# Patient Record
Sex: FEMALE | Race: WHITE | NOT HISPANIC OR LATINO | Employment: FULL TIME | URBAN - METROPOLITAN AREA
[De-identification: names, ages, dates, MRNs, and addresses within clinical notes are randomized per-mention and may not be internally consistent; named-entity substitution may affect disease eponyms.]

---

## 2022-03-07 ENCOUNTER — TELEPHONE (OUTPATIENT)
Dept: GASTROENTEROLOGY | Facility: CLINIC | Age: 57
End: 2022-03-07
Payer: COMMERCIAL

## 2022-03-07 DIAGNOSIS — Z86.010 HISTORY OF COLON POLYPS: ICD-10-CM

## 2022-03-07 DIAGNOSIS — K92.1 HEMATOCHEZIA: Primary | ICD-10-CM

## 2022-03-07 DIAGNOSIS — Z80.0 FAMILY HISTORY OF COLON CANCER: ICD-10-CM

## 2022-03-07 NOTE — TELEPHONE ENCOUNTER
----- Message from Ryann Centeno sent at 3/7/2022  9:55 AM CST -----  Bethany Clemons , Elver, is returning a missed call to schedule her appointment for a colonoscopy. 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 [phone]    He stated that she is passing blood when she uses the bathroom and that its pretty important for her to get this appointment as soon as possible. He said he has been trying to schedule for the past couple of weeks and that her dad  of colon cancer and that they are concerned.

## 2022-03-08 VITALS — WEIGHT: 189 LBS | BODY MASS INDEX: 31.49 KG/M2 | HEIGHT: 65 IN

## 2022-03-08 RX ORDER — FUROSEMIDE 40 MG/1
40 TABLET ORAL DAILY
COMMUNITY

## 2022-03-08 RX ORDER — LORATADINE 10 MG/1
10 TABLET ORAL DAILY
COMMUNITY

## 2022-03-08 RX ORDER — MONTELUKAST SODIUM 10 MG/1
10 TABLET ORAL DAILY
COMMUNITY

## 2022-03-08 RX ORDER — LISDEXAMFETAMINE DIMESYLATE 60 MG/1
60 CAPSULE ORAL EVERY MORNING
COMMUNITY

## 2022-03-08 RX ORDER — ALBUTEROL SULFATE 0.83 MG/ML
2.5 SOLUTION RESPIRATORY (INHALATION) EVERY 6 HOURS PRN
COMMUNITY

## 2022-03-08 RX ORDER — OLMESARTAN MEDOXOMIL AND HYDROCHLOROTHIAZIDE 20/12.5 20; 12.5 MG/1; MG/1
1 TABLET ORAL DAILY
COMMUNITY
End: 2024-01-05

## 2022-03-08 RX ORDER — DULOXETIN HYDROCHLORIDE 60 MG/1
60 CAPSULE, DELAYED RELEASE ORAL DAILY
COMMUNITY

## 2022-03-08 RX ORDER — LOVASTATIN 20 MG/1
20 TABLET ORAL DAILY
COMMUNITY

## 2022-03-08 RX ORDER — HYDROXYCHLOROQUINE SULFATE 200 MG/1
200 TABLET, FILM COATED ORAL 2 TIMES DAILY
COMMUNITY

## 2022-03-08 RX ORDER — LEVOTHYROXINE SODIUM 125 UG/1
125 TABLET ORAL
COMMUNITY

## 2022-03-08 RX ORDER — PANTOPRAZOLE SODIUM 40 MG/1
40 TABLET, DELAYED RELEASE ORAL DAILY
Status: ON HOLD | COMMUNITY
End: 2024-02-20

## 2022-03-09 RX ORDER — SOD SULF/POT CHLORIDE/MAG SULF 1.479 G
12 TABLET ORAL DAILY
Qty: 24 TABLET | Refills: 0 | Status: SHIPPED | OUTPATIENT
Start: 2022-03-09

## 2022-04-18 RX ORDER — SOD SULF/POT CHLORIDE/MAG SULF 1.479 G
12 TABLET ORAL DAILY
Qty: 24 TABLET | Refills: 0 | Status: SHIPPED | OUTPATIENT
Start: 2022-04-18

## 2022-04-20 ENCOUNTER — TELEPHONE (OUTPATIENT)
Dept: ADMINISTRATIVE | Facility: CLINIC | Age: 57
End: 2022-04-20
Payer: COMMERCIAL

## 2022-04-20 RX ORDER — SODIUM, POTASSIUM,MAG SULFATES 17.5-3.13G
1 SOLUTION, RECONSTITUTED, ORAL ORAL ONCE
Qty: 1 KIT | Refills: 0 | Status: SHIPPED | OUTPATIENT
Start: 2022-04-20 | End: 2022-04-20

## 2022-04-20 NOTE — TELEPHONE ENCOUNTER
----- Message from Sandra Moreno sent at 4/20/2022  4:46 PM CDT -----  Pt has procedure on 4/26 and  states they are having a hard time getting prep. Insurance is not approving it, states they need prior authorization sent from office. Please call  back at [phone](Elver) - may need to get it sent somewhere else

## 2022-04-20 NOTE — TELEPHONE ENCOUNTER
Spoke with patient to let him know we do not do PA's on preps and a different prep is being sent out to the pharmacy. And answered questions regarding pre-registering -PANCHO

## 2022-04-26 ENCOUNTER — OUTSIDE PLACE OF SERVICE (OUTPATIENT)
Dept: GASTROENTEROLOGY | Facility: CLINIC | Age: 57
End: 2022-04-26
Payer: COMMERCIAL

## 2022-04-26 LAB — CRC RECOMMENDATION EXT: NORMAL

## 2022-04-26 PROCEDURE — 45385 COLONOSCOPY W/LESION REMOVAL: CPT | Mod: 33,,, | Performed by: INTERNAL MEDICINE

## 2022-04-26 PROCEDURE — 45380 PR COLONOSCOPY,BIOPSY: ICD-10-PCS | Mod: 33,59,, | Performed by: INTERNAL MEDICINE

## 2022-04-26 PROCEDURE — 45380 COLONOSCOPY AND BIOPSY: CPT | Mod: 33,59,, | Performed by: INTERNAL MEDICINE

## 2022-04-26 PROCEDURE — 45385 PR COLONOSCOPY,REMV LESN,SNARE: ICD-10-PCS | Mod: 33,,, | Performed by: INTERNAL MEDICINE

## 2022-04-27 LAB — SPECIMEN TO PATHOLOGY: NORMAL

## 2022-04-27 NOTE — PROGRESS NOTES
7 polyps: 4 TA, 2 lymph, repeat colon w/agg prep and adult colonoscope in one year.  CMA to notify patient.  NBP

## 2022-09-29 ENCOUNTER — DOCUMENTATION ONLY (OUTPATIENT)
Dept: GASTROENTEROLOGY | Facility: CLINIC | Age: 57
End: 2022-09-29
Payer: COMMERCIAL

## 2023-04-26 DIAGNOSIS — Z80.0 FAMILY HISTORY OF COLON CANCER: ICD-10-CM

## 2023-04-26 DIAGNOSIS — Z86.010 PERSONAL HISTORY OF COLONIC POLYPS: Primary | ICD-10-CM

## 2023-05-03 VITALS — WEIGHT: 170 LBS | BODY MASS INDEX: 28.32 KG/M2 | HEIGHT: 65 IN

## 2023-05-03 RX ORDER — HYDROXYCHLOROQUINE SULFATE 200 MG/1
TABLET, FILM COATED ORAL
COMMUNITY
Start: 2023-04-05

## 2023-05-03 RX ORDER — FUROSEMIDE 40 MG/1
TABLET ORAL
COMMUNITY
Start: 2023-04-05

## 2023-05-03 RX ORDER — MONTELUKAST SODIUM 10 MG/1
TABLET ORAL
COMMUNITY
Start: 2023-03-25

## 2023-05-03 RX ORDER — LEFLUNOMIDE 20 MG/1
TABLET ORAL
COMMUNITY
Start: 2023-04-05

## 2023-05-03 RX ORDER — DULOXETIN HYDROCHLORIDE 60 MG/1
CAPSULE, DELAYED RELEASE ORAL
COMMUNITY
Start: 2023-03-25

## 2023-05-03 RX ORDER — ATORVASTATIN CALCIUM 20 MG/1
TABLET, FILM COATED ORAL
COMMUNITY
Start: 2023-04-05

## 2023-05-03 RX ORDER — ALBUTEROL SULFATE 90 UG/1
AEROSOL, METERED RESPIRATORY (INHALATION)
COMMUNITY
Start: 2023-01-07

## 2023-05-03 RX ORDER — PREDNISONE 2.5 MG/1
TABLET ORAL
COMMUNITY
Start: 2023-03-25

## 2023-05-03 RX ORDER — BELIMUMAB 200 MG/ML
SOLUTION SUBCUTANEOUS
COMMUNITY
Start: 2023-04-19

## 2023-05-03 RX ORDER — POTASSIUM CHLORIDE 20 MEQ/1
TABLET, EXTENDED RELEASE ORAL
COMMUNITY
Start: 2023-04-05

## 2023-05-03 RX ORDER — OLMESARTAN MEDOXOMIL AND HYDROCHLOROTHIAZIDE 20/12.5 20; 12.5 MG/1; MG/1
TABLET ORAL
COMMUNITY
Start: 2023-04-05 | End: 2024-01-05

## 2023-05-03 RX ORDER — FLUTICASONE FUROATE, UMECLIDINIUM BROMIDE AND VILANTEROL TRIFENATATE 200; 62.5; 25 UG/1; UG/1; UG/1
POWDER RESPIRATORY (INHALATION)
COMMUNITY
Start: 2023-04-05

## 2023-05-03 RX ORDER — PANTOPRAZOLE SODIUM 20 MG/1
TABLET, DELAYED RELEASE ORAL
Status: ON HOLD | COMMUNITY
Start: 2023-03-25 | End: 2024-02-20 | Stop reason: HOSPADM

## 2023-05-03 RX ORDER — LEVOTHYROXINE SODIUM 125 UG/1
TABLET ORAL
COMMUNITY
Start: 2023-04-05

## 2023-05-03 RX ORDER — ASPIRIN 81 MG/1
81 TABLET ORAL 2 TIMES DAILY
COMMUNITY

## 2023-05-03 RX ORDER — LISDEXAMFETAMINE DIMESYLATE 60 MG/1
CAPSULE ORAL
COMMUNITY
Start: 2023-04-24

## 2023-05-03 NOTE — TELEPHONE ENCOUNTER
Pt returned call and got scheduled for repeat colonoscopy. Pt voiced no hx of cpap, heart stent or walking problem. Pt voiced understood information will be emailed and link also.

## 2023-05-03 NOTE — TELEPHONE ENCOUNTER
Return call to pt and reached out to pt office and l/m w/ receptions to have pt call the office back to be set up for a colonoscopy.

## 2023-05-05 RX ORDER — SOD SULF/POT CHLORIDE/MAG SULF 1.479 G
12 TABLET ORAL DAILY
Qty: 24 TABLET | Refills: 0 | Status: SHIPPED | OUTPATIENT
Start: 2023-05-05

## 2023-05-05 NOTE — TELEPHONE ENCOUNTER
Order signed. Patient needs aggressive prep. She will need to take MiraLax 1 capful two or three times daily the week leading up to colonoscopy with the goal of having loose stools, then the sutab the day before.   MLC

## 2023-05-08 RX ORDER — AMOXICILLIN 500 MG
CAPSULE ORAL DAILY
COMMUNITY

## 2023-05-08 NOTE — TELEPHONE ENCOUNTER
Reached out to pt and l/m for  pt letting her know they reached back out to me and want her to have a aggressive prep and also resent out her email and updated the information

## 2023-08-23 ENCOUNTER — TELEPHONE (OUTPATIENT)
Dept: GASTROENTEROLOGY | Facility: CLINIC | Age: 58
End: 2023-08-23
Payer: COMMERCIAL

## 2023-08-23 DIAGNOSIS — R49.0 HOARSENESS: ICD-10-CM

## 2023-08-23 DIAGNOSIS — K21.9 GERD (GASTROESOPHAGEAL REFLUX DISEASE): Primary | ICD-10-CM

## 2023-08-23 NOTE — TELEPHONE ENCOUNTER
Called pt and left v/m that NBP is not available to have her colonoscopy on 9/5/23, and would like to reschedule to 9/1/23 @ [facility]. Pending call back. gunnar

## 2023-08-24 ENCOUNTER — TELEPHONE (OUTPATIENT)
Dept: GASTROENTEROLOGY | Facility: CLINIC | Age: 58
End: 2023-08-24
Payer: COMMERCIAL

## 2023-08-24 DIAGNOSIS — K21.9 GASTROESOPHAGEAL REFLUX DISEASE, UNSPECIFIED WHETHER ESOPHAGITIS PRESENT: Primary | ICD-10-CM

## 2023-08-24 DIAGNOSIS — K63.5 POLYP OF COLON, UNSPECIFIED PART OF COLON, UNSPECIFIED TYPE: ICD-10-CM

## 2023-08-24 NOTE — TELEPHONE ENCOUNTER
Last name listed as Kaci in [facility]. Pediatrician.    Referral reviewed for EGD to be added to colonoscopy. Okay to do so for GERD Dx. Needs new order for adult colonoscope. Needs aggressive prep. Referral listed Eliquis. Is patient taking Eliquis? If so, then will need clearance to hold x1 day from her cardiologist. New order signed. Needs to move colon on Tuesday 9/5/2023 to E/C with adult colonscope on Friday 9/1/2023. Confirm she has prep.  NBP

## 2023-08-24 NOTE — TELEPHONE ENCOUNTER
Called pt and left another v/m to call our office back. Need to reschedule her EGD/Colon to 9/1/23. As well as f/u if the pt is taking Eliquis. See NBP note. gunnar

## 2023-08-25 NOTE — TELEPHONE ENCOUNTER
Called patient to let her know the procedure was moved to 9/1/23. No answer. I left a voicemail telling her to call the office back. -DMP

## 2023-08-28 VITALS — BODY MASS INDEX: 28.32 KG/M2 | WEIGHT: 170 LBS | HEIGHT: 65 IN

## 2023-08-28 NOTE — TELEPHONE ENCOUNTER
"[facility]  [address] [address]  Phone: [phone]  Fax: [phone]    History & Physical         Provider: Dr. Haily Orellana    Patient Name: Bethany Estrada MD                                                                       (age):1965  58 y.o.           Gender: female   Phone: [phone]     Referring Physician: Nicki Hernández     Vital Signs:   Height - 5' 5"  Weight - 170 lb  BMI -  28.29    Plan: EGD/Colonoscopy w/adult colonoscope @ [facility]    Encounter Diagnoses   Name Primary?    Gastroesophageal reflux disease, unspecified whether esophagitis present Yes    Polyp of colon, unspecified part of colon, unspecified type            History:      Past Medical History:   Diagnosis Date    ADD (attention deficit disorder)     Anxiety disorder, unspecified     BMI 28.0-28.9,adult     Chronic constipation     Depression     Essential (primary) hypertension     GERD (gastroesophageal reflux disease)     High cholesterol     Mild intermittent asthma, uncomplicated     Systemic lupus erythematosus, organ or system involvement unspecified     Unspecified urinary incontinence     Vasospasm, coronary:  and  MI but no obstructive CAD     Venous insufficiency of leg       Past Surgical History:   Procedure Laterality Date    ABDOMINOPLASTY  2017    AUGMENTATION OF BREAST       SECTION      COLONOSCOPY      use adult colonoscope    HYSTERECTOMY      pelvic floor reconstruction         Medication List with Changes/Refills   Current Medications    ALBUTEROL (PROVENTIL) 2.5 MG /3 ML (0.083 %) NEBULIZER SOLUTION    Take 2.5 mg by nebulization every 6 (six) hours as needed. Rescue    ALBUTEROL (PROVENTIL/VENTOLIN HFA) 90 MCG/ACTUATION INHALER    use 2 sprays BY MOUTH EVERY FOUR HOURS AS DIRECTED AS NEEDED FOR BREATHING    ASPIRIN (ECOTRIN) 81 MG EC TABLET    Take 81 mg by mouth 2 (two) times a day.    ATORVASTATIN (LIPITOR) 20 MG " Problem: Potential for Falls  Goal: Patient will remain free of falls  Description  INTERVENTIONS:  - Assess patient frequently for physical needs  -  Identify cognitive and physical deficits and behaviors that affect risk of falls    -  [place] fall precautions as indicated by assessment   - Educate patient/family on patient safety including physical limitations  - Instruct patient to call for assistance with activity based on assessment  - Modify environment to reduce risk of injury  - Consider OT/PT consult to assist with strengthening/mobility  Outcome: Progressing     Problem: Nutrition/Hydration-ADULT  Goal: Nutrient/Hydration intake appropriate for improving, restoring or maintaining nutritional needs  Description  INTERVENTIONS:  - Monitor oral intake, urinary output, labs, and treatment plans  - Assess nutrition and hydration status and recommend course of action  - Evaluate amount of meals eaten  - Recommend/ encourage appropriate diets, oral nutritional supplements, and vitamin/mineral supplements  - Assess need for intravenous fluids  - Provide specific nutrition/hydration education as appropriate  - Include patient in decisions related to nutrition   Outcome: Progressing     Problem: PAIN - ADULT  Goal: Verbalizes/displays adequate comfort level or baseline comfort level  Description  Interventions:  - Encourage patient to monitor pain and request assistance  - Assess pain using appropriate pain scale  - Administer analgesics based on type and severity of pain and evaluate response  - Implement non-pharmacological measures as appropriate and evaluate response  - Consider cultural and social influences on pain and pain management  - Notify physician/advanced practitioner if interventions unsuccessful or patient reports new pain  Outcome: Progressing     Problem: INFECTION - ADULT  Goal: Absence or prevention of progression during hospitalization  Description  INTERVENTIONS:  - Assess and monitor for TABLET    TAKE ONE TABLET BY MOUTH ONE TIME A DAY AS DIRECTED FOR CHOLESTEROL    BENLYSTA 200 MG/ML ATIN    INJECT ONE PEN INJECTOR SUBCUTANEOUSLY ONCE A WEEK    DULOXETINE (CYMBALTA) 60 MG CAPSULE    Take 60 mg by mouth once daily.    DULOXETINE (CYMBALTA) 60 MG CAPSULE    TAKE ONE CAPSULE BY MOUTH ONE TIME A DAY AS DIRECTED    FLUTICASONE-UMECLIDIN-VILANTER (TRELEGY ELLIPTA) 200-62.5-25 MCG INHALER    Inhale 1 puff into the lungs once daily.    FUROSEMIDE (LASIX) 40 MG TABLET    Take 40 mg by mouth once daily.    FUROSEMIDE (LASIX) 40 MG TABLET    TAKE ONE TABLET BY MOUTH TWO TIMES A DAY AS DIRECTED AS NEEDED FOR FLUID    HYDROXYCHLOROQUINE (PLAQUENIL) 200 MG TABLET    Take 200 mg by mouth 2 (two) times a day.    HYDROXYCHLOROQUINE (PLAQUENIL) 200 MG TABLET    TAKE ONE TABLET BY MOUTH TWO TIMES A DAY AS DIRECTED FOR ARTHRITIS    LEFLUNOMIDE (ARAVA) 20 MG TAB    TAKE ONE TABLET BY MOUTH ONE TIME A DAY AS DIRECTED FOR ARTHRITIS    LEVOTHYROXINE (SYNTHROID) 125 MCG TABLET    Take 125 mcg by mouth before breakfast.    LEVOTHYROXINE (SYNTHROID) 125 MCG TABLET    TAKE ONE TABLET BY MOUTH ONE TIME A DAY IN THE MORNING on empty stomach AS DIRECTED FOR THYROID SUPPLEMENT    LISDEXAMFETAMINE (VYVANSE) 60 MG CAPSULE    Take 60 mg by mouth every morning.    LORATADINE (CLARITIN) 10 MG TABLET    Take 10 mg by mouth once daily.    LOVASTATIN (MEVACOR) 20 MG TABLET    Take 20 mg by mouth once daily.    MONTELUKAST (SINGULAIR) 10 MG TABLET    Take 10 mg by mouth once daily.    MONTELUKAST (SINGULAIR) 10 MG TABLET    TAKE ONE TABLET BY MOUTH ONE TIME A DAY AS DIRECTED FOR ALLERGIES AND ASTHMA    MULTIVIT,MIN52-FOLIC-VITK-CQ10 ORAL    Take by mouth.    OLMESARTAN-HYDROCHLOROTHIAZIDE (BENICAR HCT) 20-12.5 MG PER TABLET    Take 1 tablet by mouth once daily.    OLMESARTAN-HYDROCHLOROTHIAZIDE (BENICAR HCT) 20-12.5 MG PER TABLET    TAKE ONE TABLET BY MOUTH ONE TIME A DAY AS DIRECTED FOR BLOOD PRESSURE AND FLUID    OMEGA-3 FATTY  signs and symptoms of infection while patient is neutropenic  - Monitor lab/diagnostic results  - Monitor all insertion sites,  - Monitor endotracheal if appropriate and nasal secretions for changes in amount and color  - Whigham appropriate cooling/warming therapies per order  - Administer medications as ordered  - Instruct and encourage patient and family to use good hand hygiene technique  - Identify and instruct in appropriate neutropenic precautions for identified infection/condition   Outcome: Progressing     Problem: DISCHARGE PLANNING  Goal: Discharge to home or other facility with appropriate resources  Description  INTERVENTIONS:  - Identify barriers to discharge w/patient and caregiver  - Arrange for needed discharge resources and transportation as appropriate  - Identify discharge learning needs   - Refer to Case Management Department for coordinating discharge planning if the patient needs post-hospital services based on physician/advanced practitioner order    Outcome: Progressing     Problem: Knowledge Deficit  Goal: Patient/family/caregiver demonstrates understanding of disease process, treatment plan, medications, and discharge instructions  Description  Complete learning assessment and assess knowledge base    Interventions:  - Provide teaching at level of understanding  - Provide teaching via preferred learning methods  Outcome: Progressing     Problem: COPING  Goal: Pt/Family able to verbalize concerns and demonstrate effective coping strategies  Description  INTERVENTIONS:  - Assist patient/family to identify coping skills, available support systems and cultural and spiritual values  - Provide emotional support, including active listening and acknowledgement of concerns of patient and caregivers  - Reduce environmental stimuli, as able  - Provide patient education  - Assess for spiritual pain/suffering and initiate spiritual care, including notification of Pastoral Care or luna based community ACIDS/FISH OIL (FISH OIL-OMEGA-3 FATTY ACIDS) 300-1,000 MG CAPSULE    Take by mouth once daily.    PANTOPRAZOLE (PROTONIX) 20 MG TABLET    TAKE ONE TABLET BY MOUTH ONE TIME A DAY AS DIRECTED FOR ESOPHAGUS    PANTOPRAZOLE (PROTONIX) 40 MG TABLET    Take 40 mg by mouth once daily.    POTASSIUM CHLORIDE SA (K-DUR,KLOR-CON) 20 MEQ TABLET    TAKE ONE TABLET BY MOUTH TWO TIMES A DAY AS DIRECTED FOR POTASSIUM SUPPLEMENT    PREDNISONE (DELTASONE) 2.5 MG TABLET    TAKE TWO TABLETS BY MOUTH ONE TIME A DAY AS DIRECTED FOR INFLAMMATION    SOD SULF-POT CHLORIDE-MAG SULF (SUTAB) 1.479-0.188- 0.225 GRAM TABLET    Take 12 tablets by mouth once daily. Take according to package instructions with indicated amount of water. No breakfast day before test.    SOD SULF-POT CHLORIDE-MAG SULF (SUTAB) 1.479-0.188- 0.225 GRAM TABLET    Take 12 tablets by mouth once daily. Take according to package instructions with indicated amount of water. No breakfast day before test.    SOD SULF-POT CHLORIDE-MAG SULF (SUTAB) 1.479-0.188- 0.225 GRAM TABLET    Take 12 tablets by mouth once daily. Take according to package instructions with indicated amount of water. No breakfast day before test. May substitute with Suprep, Clenpiq, Plenvu, Moviprep or GoLytely based on Rx plan and patient preference.    SOY ISOFL/BLK COH/GR TEA/YERBA (ESTROVEN ENERGY ORAL)    Take 1 tablet by mouth Daily.    TRELEGY ELLIPTA 200-62.5-25 MCG INHALER    INHALE ONE INHALATION BY MOUTH ONE TIME A DAY AS DIRECTED FOR BREATHING    VYVANSE 60 MG CAPSULE    TAKE ONE CAPSULE BY MOUTH ONE TIME A DAY AS DIRECTED for attention deficit      Review of patient's allergies indicates:   Allergen Reactions    Sulfa (sulfonamide antibiotics) Swelling and Rash     Lips swelling     Benzoyl peroxide     Benzoyl peroxide Hives    Botox cosmetic [onabotulinumtoxina (cosmetic)]     Botox [onabotulinumtoxina] Hives    Levofloxacin     Sulfa (sulfonamide antibiotics)       Family History   Problem  as needed  - Assess effectiveness of coping strategies  Outcome: Progressing     Problem: METABOLIC, FLUID AND ELECTROLYTES - ADULT  Goal: Electrolytes maintained within normal limits  Description  INTERVENTIONS:  - Monitor labs and assess patient for signs and symptoms of electrolyte imbalances  - Administer electrolyte replacement as ordered  - Monitor response to electrolyte replacements, including repeat lab results as appropriate  - Instruct patient on fluid and nutrition as appropriate  Outcome: Progressing     Problem: HEMATOLOGIC - ADULT  Goal: Maintains hematologic stability  Description  INTERVENTIONS  - Assess for signs and symptoms of bleeding or hemorrhage  - Monitor labs  - Administer supportive blood products/factors as ordered and appropriate  Outcome: Progressing Relation Age of Onset    Heart disease Mother 72    Colon cancer Father 72    No Known Problems Maternal Grandmother     No Known Problems Maternal Grandfather     No Known Problems Paternal Grandmother     No Known Problems Paternal Grandfather     Heart failure Son 13    Colonic polyp Brother       Social History     Tobacco Use    Smoking status: Never    Smokeless tobacco: Never   Substance Use Topics    Alcohol use: Never     Comment: rare    Drug use: Never        Physical Examination:     General Appearance:___________________________  HEENT: _____________________________________  Abdomen:____________________________________  Heart:________________________________________  Lungs:_______________________________________  Extremities:___________________________________  Skin:_________________________________________  Endocrine:____________________________________  Genitourinary:_________________________________  Neurological:__________________________________      Patient has been evaluated immediately prior to sedation and is medically cleared for endoscopy with IVCS as an ASA class: ______      Physician Signature: _________________________       Date: ________  Time: ________

## 2023-08-29 ENCOUNTER — TELEPHONE (OUTPATIENT)
Dept: GASTROENTEROLOGY | Facility: CLINIC | Age: 58
End: 2023-08-29
Payer: COMMERCIAL

## 2023-08-29 NOTE — TELEPHONE ENCOUNTER
----- Message from Dedra Watts sent at 8/29/2023  8:03 AM CDT -----  Type:  Needs Medical Advice    Who Called: Bethany Estrada MD, ( pt's , Elver )   Symptoms (please be specific): -   How long has patient had these symptoms:  -  Pharmacy name and phone #:  -  Would the patient rather a call back or a response via [facility]?    Best Call Back Number:  [phone] ( Mr Clemons's elliott)   Additional Information: needs to speak w/ nurse, please call says pt has stopped taking the medication and procedure time / instructions

## 2023-08-29 NOTE — TELEPHONE ENCOUNTER
Returned pt's  Elver romero. 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  Pt is taking Eliquis prescribed by Dr. Jett ([place]) for Afib.  Pt stopped taking Eliquis as of Monday (8/28/23) until further instructed by our office.     I explained to pt's  that typically we need to obtain a cardiac clearance from the cardiologist to hold Eliquis for 1 day prior to the procedure. I told him that NBP will be in clinic this afternoon and that I will speak with her and contact him back.       I asked if the pt started MiraLax 1 capful two or three times daily the week prior to the procedure. He stated no, that she was not informed of that. However, her constipation has gotten better. She take MiraLax on a daily basis as needed. gunnar

## 2023-08-30 NOTE — TELEPHONE ENCOUNTER
Rec'd cardiac clearance from Dr. Jett 8/29/23. Pt is okay to hold Eliquis for 1 day before the procedure. Scanned into media and routed to NBP and MLC. gunnar

## 2023-09-01 ENCOUNTER — OUTSIDE PLACE OF SERVICE (OUTPATIENT)
Dept: GASTROENTEROLOGY | Facility: CLINIC | Age: 58
End: 2023-09-01

## 2023-09-01 LAB — CRC RECOMMENDATION EXT: NORMAL

## 2023-09-01 PROCEDURE — 43239 EGD BIOPSY SINGLE/MULTIPLE: CPT | Mod: ,,, | Performed by: INTERNAL MEDICINE

## 2023-09-01 PROCEDURE — 45385 PR COLONOSCOPY,REMV LESN,SNARE: ICD-10-PCS | Mod: 33,,, | Performed by: INTERNAL MEDICINE

## 2023-09-01 PROCEDURE — 45380 PR COLONOSCOPY,BIOPSY: ICD-10-PCS | Mod: 33,59,, | Performed by: INTERNAL MEDICINE

## 2023-09-01 PROCEDURE — 43239 PR EGD, FLEX, W/BIOPSY, SGL/MULTI: ICD-10-PCS | Mod: ,,, | Performed by: INTERNAL MEDICINE

## 2023-09-01 PROCEDURE — 45385 COLONOSCOPY W/LESION REMOVAL: CPT | Mod: 33,,, | Performed by: INTERNAL MEDICINE

## 2023-09-01 PROCEDURE — 45380 COLONOSCOPY AND BIOPSY: CPT | Mod: 33,59,, | Performed by: INTERNAL MEDICINE

## 2023-09-07 ENCOUNTER — TELEPHONE (OUTPATIENT)
Dept: GASTROENTEROLOGY | Facility: CLINIC | Age: 58
End: 2023-09-07
Payer: COMMERCIAL

## 2023-09-07 LAB — SPECIMEN TO PATHOLOGY: NORMAL

## 2023-09-07 NOTE — TELEPHONE ENCOUNTER
DBx nl, GBx mini chr inact gitis w/focal mild react w/o Hp, EGBx ND BE, 3 TA, 1 hyp, repeat EGD and colonoscopy w/adult colonoscope in 3 years.     Notify patient. No signs of infection, precancerous cells or Celiac disease on the upper endoscopy biopsies.  She does have Gore's esophagus w/o precancerous or cancerous cells. Her colon polyps were benign. Repeat EGD and colonoscopy in 3 years. Update in Health Maintenance section of Epic.  NBP

## 2023-10-06 DIAGNOSIS — I49.02 VENTRICULAR FLUTTER: ICD-10-CM

## 2023-10-06 DIAGNOSIS — I47.20 V-TACH: Primary | ICD-10-CM

## 2023-10-11 ENCOUNTER — OFFICE VISIT (OUTPATIENT)
Dept: ELECTROPHYSIOLOGY | Facility: CLINIC | Age: 58
End: 2023-10-11
Payer: COMMERCIAL

## 2023-10-11 ENCOUNTER — CLINICAL SUPPORT (OUTPATIENT)
Dept: CARDIOLOGY | Facility: HOSPITAL | Age: 58
End: 2023-10-11
Attending: INTERNAL MEDICINE
Payer: COMMERCIAL

## 2023-10-11 VITALS
WEIGHT: 180.31 LBS | HEIGHT: 65 IN | HEART RATE: 80 BPM | DIASTOLIC BLOOD PRESSURE: 76 MMHG | SYSTOLIC BLOOD PRESSURE: 160 MMHG | BODY MASS INDEX: 30.04 KG/M2

## 2023-10-11 DIAGNOSIS — I42.8 NICM (NONISCHEMIC CARDIOMYOPATHY): ICD-10-CM

## 2023-10-11 DIAGNOSIS — E78.5 HYPERLIPIDEMIA, UNSPECIFIED HYPERLIPIDEMIA TYPE: ICD-10-CM

## 2023-10-11 DIAGNOSIS — T82.897A OTHER SPECIFIED COMPLICATION OF CARDIAC PROSTHETIC DEVICES, IMPLANTS AND GRAFTS, INITIAL ENCOUNTER: ICD-10-CM

## 2023-10-11 DIAGNOSIS — E03.9 HYPOTHYROIDISM, UNSPECIFIED TYPE: ICD-10-CM

## 2023-10-11 DIAGNOSIS — I48.19 ATRIAL FIBRILLATION, PERSISTENT: ICD-10-CM

## 2023-10-11 DIAGNOSIS — I47.20 V-TACH: ICD-10-CM

## 2023-10-11 DIAGNOSIS — R00.0 WIDE-COMPLEX TACHYCARDIA: ICD-10-CM

## 2023-10-11 DIAGNOSIS — I47.29 OTHER VENTRICULAR TACHYCARDIA: ICD-10-CM

## 2023-10-11 DIAGNOSIS — Z95.810 PRESENCE OF AUTOMATIC (IMPLANTABLE) CARDIAC DEFIBRILLATOR: ICD-10-CM

## 2023-10-11 PROCEDURE — 1159F PR MEDICATION LIST DOCUMENTED IN MEDICAL RECORD: ICD-10-PCS | Mod: CPTII,S$GLB,, | Performed by: INTERNAL MEDICINE

## 2023-10-11 PROCEDURE — 1160F RVW MEDS BY RX/DR IN RCRD: CPT | Mod: CPTII,S$GLB,, | Performed by: INTERNAL MEDICINE

## 2023-10-11 PROCEDURE — 4010F PR ACE/ARB THEARPY RXD/TAKEN: ICD-10-PCS | Mod: CPTII,S$GLB,, | Performed by: INTERNAL MEDICINE

## 2023-10-11 PROCEDURE — 3078F PR MOST RECENT DIASTOLIC BLOOD PRESSURE < 80 MM HG: ICD-10-PCS | Mod: CPTII,S$GLB,, | Performed by: INTERNAL MEDICINE

## 2023-10-11 PROCEDURE — 99204 PR OFFICE/OUTPT VISIT, NEW, LEVL IV, 45-59 MIN: ICD-10-PCS | Mod: S$GLB,,, | Performed by: INTERNAL MEDICINE

## 2023-10-11 PROCEDURE — 3008F BODY MASS INDEX DOCD: CPT | Mod: CPTII,S$GLB,, | Performed by: INTERNAL MEDICINE

## 2023-10-11 PROCEDURE — 4010F ACE/ARB THERAPY RXD/TAKEN: CPT | Mod: CPTII,S$GLB,, | Performed by: INTERNAL MEDICINE

## 2023-10-11 PROCEDURE — 3008F PR BODY MASS INDEX (BMI) DOCUMENTED: ICD-10-PCS | Mod: CPTII,S$GLB,, | Performed by: INTERNAL MEDICINE

## 2023-10-11 PROCEDURE — 99999 PR PBB SHADOW E&M-EST. PATIENT-LVL III: CPT | Mod: PBBFAC,,, | Performed by: INTERNAL MEDICINE

## 2023-10-11 PROCEDURE — 3077F PR MOST RECENT SYSTOLIC BLOOD PRESSURE >= 140 MM HG: ICD-10-PCS | Mod: CPTII,S$GLB,, | Performed by: INTERNAL MEDICINE

## 2023-10-11 PROCEDURE — 3078F DIAST BP <80 MM HG: CPT | Mod: CPTII,S$GLB,, | Performed by: INTERNAL MEDICINE

## 2023-10-11 PROCEDURE — 99204 OFFICE O/P NEW MOD 45 MIN: CPT | Mod: S$GLB,,, | Performed by: INTERNAL MEDICINE

## 2023-10-11 PROCEDURE — 93283 PRGRMG EVAL IMPLANTABLE DFB: CPT

## 2023-10-11 PROCEDURE — 1160F PR REVIEW ALL MEDS BY PRESCRIBER/CLIN PHARMACIST DOCUMENTED: ICD-10-PCS | Mod: CPTII,S$GLB,, | Performed by: INTERNAL MEDICINE

## 2023-10-11 PROCEDURE — 99999 PR PBB SHADOW E&M-EST. PATIENT-LVL III: ICD-10-PCS | Mod: PBBFAC,,, | Performed by: INTERNAL MEDICINE

## 2023-10-11 PROCEDURE — 3077F SYST BP >= 140 MM HG: CPT | Mod: CPTII,S$GLB,, | Performed by: INTERNAL MEDICINE

## 2023-10-11 PROCEDURE — 1159F MED LIST DOCD IN RCRD: CPT | Mod: CPTII,S$GLB,, | Performed by: INTERNAL MEDICINE

## 2023-10-11 RX ORDER — CARVEDILOL 3.12 MG/1
3.12 TABLET ORAL 2 TIMES DAILY
COMMUNITY
Start: 2023-10-05 | End: 2024-01-05 | Stop reason: SDUPTHER

## 2023-10-11 RX ORDER — SACUBITRIL AND VALSARTAN 24; 26 MG/1; MG/1
1 TABLET, FILM COATED ORAL 2 TIMES DAILY
COMMUNITY
Start: 2023-10-05

## 2023-10-11 RX ORDER — AMIODARONE HYDROCHLORIDE 200 MG/1
200 TABLET ORAL DAILY
COMMUNITY
End: 2024-01-05

## 2023-10-11 RX ORDER — APIXABAN 5 MG/1
5 TABLET, FILM COATED ORAL 2 TIMES DAILY
COMMUNITY
Start: 2023-10-02

## 2023-10-11 NOTE — PROGRESS NOTES
Subjective:   Patient ID:  Bethany Sean TEE is a 58 y.o. 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  In setting of recently starting propafenone for AF Rx, developed a WCT at home.   EMS ECG showed WCT with RBBB, R sup axis, transition V4. No Syncope (though nearly so); DCCV'd in the field. [place] better when back to NSR. ICD implanted by Dr Yoo in [place].    AF was DCCV'd 10/2/23 as well. She feels fatigued all the time. SOB. Hard to walk without ASTORGA. GDMT is being titrated by primary cardiologist Dr. Jett ([place]).    cMRI: no scar. 43% LVEF.  echo 2023: 40%  event monitor: SR, 9% PVCs (multifocal)  Stress test 1/2019 neg    My interpretation of today's ECG is APVS. Poor R wave progression. Low voltage.    Review of Systems   Constitutional: Positive for malaise/fatigue.   HENT: Negative.  Negative for ear pain and tinnitus.    Eyes:  Negative for blurred vision.   Cardiovascular:  Positive for dyspnea on exertion. Negative for chest pain, near-syncope, palpitations and syncope.   Respiratory:  Positive for shortness of breath.    Endocrine: Negative.  Negative for polyuria.   Hematologic/Lymphatic: Does not bruise/bleed easily.   Skin: Negative.  Negative for rash.   Musculoskeletal: Negative.  Negative for joint pain and muscle weakness.   Gastrointestinal: Negative.  Negative for abdominal pain and change in bowel habit.   Genitourinary:  Negative for frequency.   Neurological: Negative.  Negative for dizziness and weakness.   Psychiatric/Behavioral: Negative.  Negative for depression. The patient is not  nervous/anxious.    Allergic/Immunologic: Negative for environmental allergies.       Objective:   Physical Exam  Vitals and nursing note reviewed.   Constitutional:       Appearance: Normal appearance. She is well-developed.   HENT:      Head: Normocephalic and atraumatic.   Eyes:      Conjunctiva/sclera: Conjunctivae normal.   Neck:      Thyroid: No thyroid mass or thyromegaly.      Trachea: No tracheal deviation.   Cardiovascular:      Rate and Rhythm: Normal rate and regular rhythm.   Pulmonary:      Effort: Pulmonary effort is normal. No respiratory distress.      Breath sounds: No wheezing.   Abdominal:      General: There is no distension.   Musculoskeletal:         General: Normal range of motion.      Cervical back: Normal range of motion. No edema.   Skin:     General: Skin is warm and dry.      Findings: No rash.   Neurological:      Mental Status: She is alert and oriented to person, place, and time.      Coordination: Coordination normal.   Psychiatric:         Speech: Speech normal.         Behavior: Behavior normal. Behavior is cooperative.         Thought Content: Thought content normal.         Assessment:      1. NICM (nonischemic cardiomyopathy)    2. Wide-complex tachycardia    3. Atrial fibrillation, persistent    4. Hyperlipidemia, unspecified hyperlipidemia type    5. Hypothyroidism, unspecified type        Plan:     Complex case.  WCT most likely VT. Not likely SVT with aberrancy, despite propafenone, as the morphology is not typical for that.  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  Persistent AF    CM  Dr. Jett has begun GDMT; defer titration to them.  PET stress.  Will send genetic screen for familial CM.    2. Discussed AF and its basic pathophysiology, including its health implications and treatment options (rate vs. rhythm control, meds vs. procedural/device treatment) as appropriate for the patient.  Especially given HF, will try hard to rhythm control, including likely PVI eventually.  Pt will find  "ECG of "AFL" -- which may influence her PVI procedure.  Amio for now. Check TSH, LFTs.    3. ICD  Follow here.  Added rate-responsiveness today.          "

## 2023-10-11 NOTE — LETTER
October 11, 2023        Tyshawn Jett MD  [address]  [address]  [facility] [address]             [facility] - Electrophysiology [facility]  [address]  [facility] [address]  Phone: [phone]  Fax: [phone]   Patient: Bethany Estrada MD   MR Number: [id number]   YOB: 1965   Date of Visit: 10/11/2023       Dear Dr. Jett:    Thank you for referring Bethany Estrada MD to me for evaluation. Attached you will find relevant portions of my assessment and plan of care.    If you have questions, please do not hesitate to call me. I look forward to following Bethany Estrada MD along with you.    Sincerely,      Jose Alejandro Fernandes MD            CC    No Recipients    Enclosure

## 2023-10-12 ENCOUNTER — PATIENT MESSAGE (OUTPATIENT)
Dept: CARDIOLOGY | Facility: CLINIC | Age: 58
End: 2023-10-12
Payer: COMMERCIAL

## 2023-10-13 ENCOUNTER — PATIENT MESSAGE (OUTPATIENT)
Dept: ELECTROPHYSIOLOGY | Facility: CLINIC | Age: 58
End: 2023-10-13
Payer: COMMERCIAL

## 2023-10-15 NOTE — TELEPHONE ENCOUNTER
Pt sent an ECG interpreted as AFL from 5/13. It appears to be more consistent with AF. Definitely not typical AFL.

## 2023-10-16 LAB
OHS CV AF BURDEN PERCENT: < 1
OHS CV DC REMOTE DEVICE TYPE: NORMAL
OHS CV RV PACING PERCENT: 0 %

## 2023-10-24 ENCOUNTER — TELEPHONE (OUTPATIENT)
Dept: ELECTROPHYSIOLOGY | Facility: CLINIC | Age: 58
End: 2023-10-24
Payer: COMMERCIAL

## 2023-10-24 DIAGNOSIS — Z79.899 HIGH RISK MEDICATIONS (NOT ANTICOAGULANTS) LONG-TERM USE: Primary | ICD-10-CM

## 2023-10-24 NOTE — TELEPHONE ENCOUNTER
Jose Alejandro Fernandes MD Bush, Becky, RN  Caller: Unspecified (Yesterday,  9:12 AM)  The screening test TSH was mildly high, but the confirmatory test was within normal range. This does not require any treatment or further evaluation.                 Spoke with spouse, informed him of Dr Fernandes's message, will need hepatic panel done, will fax orders to [facility] in [place] per his request, also informed him that the genetic testing ordered by Dr Fernandes has been ordered through [facility] [facility] and they will receive the testing kit at their home, spouse also wanting to see about scheduling her PET stress that was ordered, will have pt called to get that scheduled

## 2023-10-24 NOTE — TELEPHONE ENCOUNTER
----- Message from Armida Arredondo MA sent at 10/23/2023  9:37 AM CDT -----  Regarding: FW: Results  Is there anything I should relay to the pt from their lab results?   ----- Message -----  From: Sangeetha Monaco  Sent: 10/23/2023   9:14 AM CDT  To: [facility] Staff  Subject: Results                                          Patient  Elver calling to see if his wife test results came back. Please call back@ [phone]. Thank you Sangeetha

## 2023-10-24 NOTE — TELEPHONE ENCOUNTER
Called pt to schedule them for a test. There was no answer so a message and callback number was left.

## 2023-10-26 ENCOUNTER — TELEPHONE (OUTPATIENT)
Dept: ELECTROPHYSIOLOGY | Facility: CLINIC | Age: 58
End: 2023-10-26
Payer: COMMERCIAL

## 2023-10-26 NOTE — TELEPHONE ENCOUNTER
"Pt's  called to confirm stress test time and location. Notified them that there were no records available through [facility]. Pt's  said the provider left and that th ept was seen there 10-15 years ago. They received records and will upload them into the portal although stating that they "aren't much".   "

## 2023-10-30 LAB — EGD FOLLOW UP EXTERNAL: NORMAL

## 2023-11-09 ENCOUNTER — CLINICAL SUPPORT (OUTPATIENT)
Dept: CARDIOLOGY | Facility: HOSPITAL | Age: 58
End: 2023-11-09
Attending: INTERNAL MEDICINE
Payer: COMMERCIAL

## 2023-11-09 DIAGNOSIS — T82.897A OTHER SPECIFIED COMPLICATION OF CARDIAC PROSTHETIC DEVICES, IMPLANTS AND GRAFTS, INITIAL ENCOUNTER: ICD-10-CM

## 2023-11-09 DIAGNOSIS — Z95.810 PRESENCE OF AUTOMATIC (IMPLANTABLE) CARDIAC DEFIBRILLATOR: ICD-10-CM

## 2023-11-09 DIAGNOSIS — I47.29 OTHER VENTRICULAR TACHYCARDIA: ICD-10-CM

## 2023-11-09 PROCEDURE — 93296 REM INTERROG EVL PM/IDS: CPT | Performed by: INTERNAL MEDICINE

## 2023-11-09 PROCEDURE — 93295 DEV INTERROG REMOTE 1/2/MLT: CPT | Mod: ,,, | Performed by: INTERNAL MEDICINE

## 2023-11-09 PROCEDURE — 93295 CARDIAC DEVICE CHECK CHECK - REMOTE ALERT: ICD-10-PCS | Mod: ,,, | Performed by: INTERNAL MEDICINE

## 2023-11-14 LAB
OHS CV AF BURDEN PERCENT: < 1
OHS CV DC REMOTE DEVICE TYPE: NORMAL
OHS CV ICD SHOCK: NO
OHS CV RV PACING PERCENT: 9 %

## 2023-11-29 ENCOUNTER — TELEPHONE (OUTPATIENT)
Dept: ELECTROPHYSIOLOGY | Facility: CLINIC | Age: 58
End: 2023-11-29
Payer: COMMERCIAL

## 2023-11-29 NOTE — TELEPHONE ENCOUNTER
Remote Alert from [facility]  AF in progress since 11/26/23, rate controlled  On Eliquis, Amiodarone and Coreg  S/p DCCV 10/2/23      Left vm to assess symptoms/med compliance.    Awaiting return ph call and then will forward to Dr. Fernandes       @ [phone] Spoke with pt who reports SOB, fatigue.  She is compliant with meds.  Pt states her Cardiologist increased her Coreg from 3.125mg to 6.25mg BID one month ago  Her bp has been running 160/100 and less    She has an appt tomorrow with her local Cardiologist.    Will forward to Dr. Fernandes          ___

## 2023-11-30 NOTE — TELEPHONE ENCOUNTER
Called pt and advised her of Dr. Fernandes's plan of poss MÓNICA/DCCV with local cardiologist and to f/u in clinic to discuss PVI..    Pt has a PET stress scheduled 12/14/2023.    She is asking if she can have the stress test if in AF?    Will ask Dr. Fernandes

## 2023-12-07 ENCOUNTER — TELEPHONE (OUTPATIENT)
Dept: ELECTROPHYSIOLOGY | Facility: CLINIC | Age: 58
End: 2023-12-07
Payer: COMMERCIAL

## 2023-12-07 DIAGNOSIS — I42.8 NICM (NONISCHEMIC CARDIOMYOPATHY): Primary | ICD-10-CM

## 2023-12-07 NOTE — TELEPHONE ENCOUNTER
----- Message from Armida Arredondo MA sent at 12/7/2023  2:53 PM CST -----  Pt has questions about Nuclear test and similarities to PET.

## 2023-12-07 NOTE — TELEPHONE ENCOUNTER
Spoke with pt, reviewed difference in stress tests, pt wants to get a date for her PVI, she is aware that she needs to come in for her clinic appt in Jan to discuss the ablation with Dr Fernandes after her testing is complete. Will hold Feb date for her   [Dear  ___] : Dear  [unfilled], [Consult Letter:] : I had the pleasure of evaluating your patient, [unfilled]. [Please see my note below.] : Please see my note below. [Consult Closing:] : Thank you very much for allowing me to participate in the care of this patient.  If you have any questions, please do not hesitate to contact me. [Sincerely,] : Sincerely, [FreeTextEntry3] : Angelia Caruso MD\par Attending Physician\par Pediatric [facility]

## 2023-12-13 ENCOUNTER — TELEPHONE (OUTPATIENT)
Dept: CARDIOLOGY | Facility: HOSPITAL | Age: 58
End: 2023-12-13
Payer: COMMERCIAL

## 2023-12-15 ENCOUNTER — HOSPITAL ENCOUNTER (OUTPATIENT)
Dept: CARDIOLOGY | Facility: HOSPITAL | Age: 58
Discharge: HOME | End: 2023-12-15
Attending: INTERNAL MEDICINE
Payer: COMMERCIAL

## 2023-12-15 VITALS
SYSTOLIC BLOOD PRESSURE: 144 MMHG | DIASTOLIC BLOOD PRESSURE: 90 MMHG | HEART RATE: 71 BPM | HEIGHT: 65 IN | BODY MASS INDEX: 29.99 KG/M2 | WEIGHT: 180 LBS

## 2023-12-15 DIAGNOSIS — I42.8 NICM (NONISCHEMIC CARDIOMYOPATHY): ICD-10-CM

## 2023-12-15 LAB
CV PHARM DOSE: 0.4 MG
CV STRESS BASE HR: 71 BPM
DIASTOLIC BLOOD PRESSURE: 90 MMHG
EJECTION FRACTION- HIGH: 59 %
END DIASTOLIC INDEX-HIGH: 155 ML/M2
END DIASTOLIC INDEX-LOW: 91 ML/M2
END SYSTOLIC INDEX-HIGH: 78 ML/M2
END SYSTOLIC INDEX-LOW: 40 ML/M2
NUC REST DIASTOLIC VOLUME INDEX: 113
NUC REST EJECTION FRACTION: 41
NUC REST SYSTOLIC VOLUME INDEX: 66
NUC STRESS DIASTOLIC VOLUME INDEX: 119
NUC STRESS EJECTION FRACTION: 35 %
NUC STRESS SYSTOLIC VOLUME INDEX: 78
OHS CV CPX 1 MINUTE RECOVERY HEART RATE: 68 BPM
OHS CV CPX 85 PERCENT MAX PREDICTED HEART RATE MALE: 136
OHS CV CPX MAX PREDICTED HEART RATE: 160
OHS CV CPX PATIENT IS FEMALE: 1
OHS CV CPX PATIENT IS MALE: 0
OHS CV CPX PEAK DIASTOLIC BLOOD PRESSURE: 93 MMHG
OHS CV CPX PEAK HEAR RATE: 70 BPM
OHS CV CPX PEAK RATE PRESSURE PRODUCT: 9240
OHS CV CPX PEAK SYSTOLIC BLOOD PRESSURE: 132 MMHG
OHS CV CPX PERCENT MAX PREDICTED HEART RATE ACHIEVED: 46
OHS CV CPX RATE PRESSURE PRODUCT PRESENTING: NORMAL
RETIRED EF AND QEF - SEE NOTES: 47 %
SYSTOLIC BLOOD PRESSURE: 144 MMHG

## 2023-12-15 PROCEDURE — 93018 NUCLEAR STRESS - CARDIOLOGY INTERPRETED (CUPID ONLY): ICD-10-PCS | Mod: ,,, | Performed by: INTERNAL MEDICINE

## 2023-12-15 PROCEDURE — A9502 TC99M TETROFOSMIN: HCPCS

## 2023-12-15 PROCEDURE — 93016 NUCLEAR STRESS - CARDIOLOGY INTERPRETED (CUPID ONLY): ICD-10-PCS | Mod: ,,, | Performed by: INTERNAL MEDICINE

## 2023-12-15 PROCEDURE — 93018 CV STRESS TEST I&R ONLY: CPT | Mod: ,,, | Performed by: INTERNAL MEDICINE

## 2023-12-15 PROCEDURE — 78452 NUCLEAR STRESS - CARDIOLOGY INTERPRETED (CUPID ONLY): ICD-10-PCS | Mod: 26,,, | Performed by: INTERNAL MEDICINE

## 2023-12-15 PROCEDURE — 93016 CV STRESS TEST SUPVJ ONLY: CPT | Mod: ,,, | Performed by: INTERNAL MEDICINE

## 2023-12-15 PROCEDURE — 78452 HT MUSCLE IMAGE SPECT MULT: CPT

## 2023-12-15 PROCEDURE — 63600175 PHARM REV CODE 636 W HCPCS: Performed by: INTERNAL MEDICINE

## 2023-12-15 PROCEDURE — 78452 HT MUSCLE IMAGE SPECT MULT: CPT | Mod: 26,,, | Performed by: INTERNAL MEDICINE

## 2023-12-15 RX ORDER — AMINOPHYLLINE 25 MG/ML
75 INJECTION, SOLUTION INTRAVENOUS ONCE
Status: COMPLETED | OUTPATIENT
Start: 2023-12-15 | End: 2023-12-15

## 2023-12-15 RX ORDER — REGADENOSON 0.08 MG/ML
0.4 INJECTION, SOLUTION INTRAVENOUS
Status: COMPLETED | OUTPATIENT
Start: 2023-12-15 | End: 2023-12-15

## 2023-12-15 RX ADMIN — REGADENOSON 0.4 MG: 0.08 INJECTION, SOLUTION INTRAVENOUS at 09:12

## 2023-12-15 RX ADMIN — AMINOPHYLLINE 75 MG: 25 INJECTION, SOLUTION INTRAVENOUS at 09:12

## 2024-01-05 ENCOUNTER — OFFICE VISIT (OUTPATIENT)
Dept: ELECTROPHYSIOLOGY | Facility: CLINIC | Age: 59
End: 2024-01-05
Payer: COMMERCIAL

## 2024-01-05 ENCOUNTER — CLINICAL SUPPORT (OUTPATIENT)
Dept: CARDIOLOGY | Facility: HOSPITAL | Age: 59
End: 2024-01-05
Attending: INTERNAL MEDICINE
Payer: COMMERCIAL

## 2024-01-05 ENCOUNTER — HOSPITAL ENCOUNTER (OUTPATIENT)
Dept: CARDIOLOGY | Facility: CLINIC | Age: 59
Discharge: HOME | End: 2024-01-05
Payer: COMMERCIAL

## 2024-01-05 VITALS
BODY MASS INDEX: 29.38 KG/M2 | SYSTOLIC BLOOD PRESSURE: 144 MMHG | HEIGHT: 65 IN | HEART RATE: 70 BPM | WEIGHT: 176.38 LBS | DIASTOLIC BLOOD PRESSURE: 90 MMHG

## 2024-01-05 DIAGNOSIS — I48.19 ATRIAL FIBRILLATION, PERSISTENT: Primary | ICD-10-CM

## 2024-01-05 DIAGNOSIS — I47.20 V-TACH: ICD-10-CM

## 2024-01-05 DIAGNOSIS — E78.5 HYPERLIPIDEMIA, UNSPECIFIED HYPERLIPIDEMIA TYPE: ICD-10-CM

## 2024-01-05 DIAGNOSIS — I42.8 NICM (NONISCHEMIC CARDIOMYOPATHY): ICD-10-CM

## 2024-01-05 PROCEDURE — 1159F MED LIST DOCD IN RCRD: CPT | Mod: CPTII,S$GLB,, | Performed by: INTERNAL MEDICINE

## 2024-01-05 PROCEDURE — 3080F DIAST BP >= 90 MM HG: CPT | Mod: CPTII,S$GLB,, | Performed by: INTERNAL MEDICINE

## 2024-01-05 PROCEDURE — 93284 PRGRMG EVAL IMPLANTABLE DFB: CPT | Mod: 26,,, | Performed by: INTERNAL MEDICINE

## 2024-01-05 PROCEDURE — 1160F RVW MEDS BY RX/DR IN RCRD: CPT | Mod: CPTII,S$GLB,, | Performed by: INTERNAL MEDICINE

## 2024-01-05 PROCEDURE — 3008F BODY MASS INDEX DOCD: CPT | Mod: CPTII,S$GLB,, | Performed by: INTERNAL MEDICINE

## 2024-01-05 PROCEDURE — 3077F SYST BP >= 140 MM HG: CPT | Mod: CPTII,S$GLB,, | Performed by: INTERNAL MEDICINE

## 2024-01-05 PROCEDURE — 93010 ELECTROCARDIOGRAM REPORT: CPT | Mod: S$GLB,,, | Performed by: INTERNAL MEDICINE

## 2024-01-05 PROCEDURE — 93005 ELECTROCARDIOGRAM TRACING: CPT | Mod: S$GLB,,, | Performed by: INTERNAL MEDICINE

## 2024-01-05 PROCEDURE — 99215 OFFICE O/P EST HI 40 MIN: CPT | Mod: S$GLB,,, | Performed by: INTERNAL MEDICINE

## 2024-01-05 PROCEDURE — 93283 PRGRMG EVAL IMPLANTABLE DFB: CPT

## 2024-01-05 PROCEDURE — 99999 PR PBB SHADOW E&M-EST. PATIENT-LVL V: CPT | Mod: PBBFAC,,, | Performed by: INTERNAL MEDICINE

## 2024-01-05 RX ORDER — CARVEDILOL 6.25 MG/1
6.25 TABLET ORAL 2 TIMES DAILY
Qty: 180 TABLET | Refills: 3 | Status: ON HOLD | OUTPATIENT
Start: 2024-01-05 | End: 2024-02-20

## 2024-01-05 NOTE — PROGRESS NOTES
Subjective:   Patient ID:  Ursula Estrada is a 58 y.o. 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  In setting of recently starting propafenone for AF Rx, developed a WCT at home.   EMS ECG showed WCT with RBBB, R sup axis, transition V4. No Syncope (though nearly so); DCCV'd in the field. [place] better when back to NSR. ICD implanted by Dr Yoo in [place].    AF was DCCV'd 10/2/23 as well. She feels fatigued all the time. SOB. Hard to walk without ASTORGA. GDMT is being titrated by primary cardiologist Dr. Jett ([place]).    cMRI: no scar. 43% LVEF.  echo 2023: 40%  event monitor: SR, 9% PVCs (multifocal)  Stress test 1/2019 neg    Update 1/2024:  Feeling well today. Still gets symptomatic AF.  Her device shows 11% AF despite amiodarone. RVP 8%    My interpretation of today's ECG is APVS.     Review of Systems   Constitutional: Positive for malaise/fatigue.   HENT: Negative.  Negative for ear pain and tinnitus.    Eyes:  Negative for blurred vision.   Cardiovascular:  Positive for dyspnea on exertion. Negative for chest pain, near-syncope, palpitations and syncope.   Respiratory:  Positive for shortness of breath.    Endocrine: Negative.  Negative for polyuria.   Hematologic/Lymphatic: Does not bruise/bleed easily.   Skin: Negative.  Negative for rash.   Musculoskeletal: Negative.  Negative for joint pain and muscle weakness.   Gastrointestinal: Negative.  Negative for abdominal pain and change in bowel habit.   Genitourinary:  Negative for frequency.   Neurological: Negative.  Negative for dizziness and weakness.    Psychiatric/Behavioral: Negative.  Negative for depression. The patient is not nervous/anxious.    Allergic/Immunologic: Negative for environmental allergies.       Objective:   Physical Exam  Vitals and nursing note reviewed.   Constitutional:       Appearance: Normal appearance. She is well-developed.   HENT:      Head: Normocephalic and atraumatic.   Eyes:      Conjunctiva/sclera: Conjunctivae normal.   Neck:      Thyroid: No thyroid mass or thyromegaly.      Trachea: No tracheal deviation.   Cardiovascular:      Rate and Rhythm: Normal rate and regular rhythm.   Pulmonary:      Effort: Pulmonary effort is normal. No respiratory distress.      Breath sounds: No wheezing.   Abdominal:      General: There is no distension.   Musculoskeletal:         General: Normal range of motion.      Cervical back: Normal range of motion. No edema.   Skin:     General: Skin is warm and dry.      Findings: No rash.   Neurological:      Mental Status: She is alert and oriented to person, place, and time.      Coordination: Coordination normal.   Psychiatric:         Speech: Speech normal.         Behavior: Behavior normal. Behavior is cooperative.         Thought Content: Thought content normal.         Assessment:      1. Atrial fibrillation, persistent    2. NICM (nonischemic cardiomyopathy)    3. Hyperlipidemia, unspecified hyperlipidemia type        Plan:     Complex case.  WCT most likely VT. Not likely SVT with aberrancy, despite propafenone, as the morphology is not typical for that.  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  Persistent AF    CM  Dr. Jett has begun GDMT; defer titration to them.  PET stress was negative.  Genetic screen for familial CM. (pending)    2. Discussed AF and its basic pathophysiology, including its health implications and treatment options (rate vs. rhythm control, meds vs. procedural/device treatment) as appropriate for the patient.  d/c amio today in prep for PVI  Informed consent was obtained, we discussed the  risks and benefits of  the procedure (pulmonary vein isolation) which included (but was not limited to) the possibility of bleeding or injury to the vessels involved, embolism, cardiac perforation, cardiac tamponade requiring emergent drainage with a pericardial drain or surgery, esophageal injury or fistula formation, phrenic nerve injury, pulmonary vein stenosis, injury to the native conduction system of the heart requiring a PPM, renal injury if contrast used, MI, stroke, and death. We also reviewed indications, and alternatives of the planned procedure. All questions were answered. Patient wishes to proceed.  I discussed with patient risks, indications, benefits, and alternatives of the planned procedure. All questions were answered. Patient understands and wishes to proceed.  Plan RF PVI following amio washout.    3. ICD  Follow here.

## 2024-01-08 LAB
OHS CV AF BURDEN PERCENT: 11
OHS CV DC REMOTE DEVICE TYPE: NORMAL
OHS CV RV PACING PERCENT: 8 %

## 2024-01-12 ENCOUNTER — PATIENT MESSAGE (OUTPATIENT)
Dept: ELECTROPHYSIOLOGY | Facility: CLINIC | Age: 59
End: 2024-01-12
Payer: COMMERCIAL

## 2024-01-12 DIAGNOSIS — I48.19 ATRIAL FIBRILLATION, PERSISTENT: Primary | ICD-10-CM

## 2024-02-06 ENCOUNTER — HOSPITAL ENCOUNTER (OUTPATIENT)
Dept: RADIOLOGY | Facility: HOSPITAL | Age: 59
Discharge: HOME | End: 2024-02-06
Attending: INTERNAL MEDICINE
Payer: COMMERCIAL

## 2024-02-06 DIAGNOSIS — I48.19 ATRIAL FIBRILLATION, PERSISTENT: ICD-10-CM

## 2024-02-06 LAB
CREAT SERPL-MCNC: 1.1 MG/DL (ref 0.5–1.4)
SAMPLE: NORMAL

## 2024-02-06 PROCEDURE — 71275 CT ANGIOGRAPHY CHEST: CPT | Mod: 26,,, | Performed by: RADIOLOGY

## 2024-02-06 PROCEDURE — 71275 CT ANGIOGRAPHY CHEST: CPT | Mod: TC

## 2024-02-06 PROCEDURE — 25500020 PHARM REV CODE 255: Performed by: INTERNAL MEDICINE

## 2024-02-06 RX ADMIN — IOHEXOL 100 ML: 350 INJECTION, SOLUTION INTRAVENOUS at 03:02

## 2024-02-08 ENCOUNTER — CLINICAL SUPPORT (OUTPATIENT)
Dept: CARDIOLOGY | Facility: HOSPITAL | Age: 59
End: 2024-02-08
Attending: INTERNAL MEDICINE
Payer: COMMERCIAL

## 2024-02-08 ENCOUNTER — CLINICAL SUPPORT (OUTPATIENT)
Dept: CARDIOLOGY | Facility: HOSPITAL | Age: 59
End: 2024-02-08
Payer: COMMERCIAL

## 2024-02-08 DIAGNOSIS — Z95.810 PRESENCE OF AUTOMATIC (IMPLANTABLE) CARDIAC DEFIBRILLATOR: ICD-10-CM

## 2024-02-08 DIAGNOSIS — Z95.0 PRESENCE OF CARDIAC PACEMAKER: ICD-10-CM

## 2024-02-08 DIAGNOSIS — I50.32 CHRONIC DIASTOLIC (CONGESTIVE) HEART FAILURE: ICD-10-CM

## 2024-02-08 DIAGNOSIS — I47.29 OTHER VENTRICULAR TACHYCARDIA: ICD-10-CM

## 2024-02-08 DIAGNOSIS — I42.0 DILATED CARDIOMYOPATHY: ICD-10-CM

## 2024-02-08 DIAGNOSIS — I44.1 ATRIOVENTRICULAR BLOCK, SECOND DEGREE: ICD-10-CM

## 2024-02-08 DIAGNOSIS — I47.20 VENTRICULAR TACHYCARDIA, UNSPECIFIED: ICD-10-CM

## 2024-02-08 LAB
OHS CV AF BURDEN PERCENT: 2
OHS CV DC REMOTE DEVICE TYPE: NORMAL
OHS CV ICD SHOCK: NO
OHS CV RV PACING PERCENT: 9 %

## 2024-02-08 PROCEDURE — 93296 REM INTERROG EVL PM/IDS: CPT | Performed by: INTERNAL MEDICINE

## 2024-02-08 PROCEDURE — 93295 DEV INTERROG REMOTE 1/2/MLT: CPT | Mod: ,,, | Performed by: INTERNAL MEDICINE

## 2024-02-16 ENCOUNTER — TELEPHONE (OUTPATIENT)
Dept: ELECTROPHYSIOLOGY | Facility: CLINIC | Age: 59
End: 2024-02-16
Payer: COMMERCIAL

## 2024-02-16 NOTE — TELEPHONE ENCOUNTER
Spoke to patient    CONFIRMED procedure arrival time of 8am    Reiterated instructions including:  -Directions to check in desk  -NPO after midnight night prior to procedure  -High importance of HOLDING amiodarone-stopped 1/5, instructed on NO eliquis the morning of the procedure   -Pre-procedure LABS reviewed no alerts noted   -Confirmed absence or presence of implanted device/stimulator ICD only   -Confirmed no fever, cough, or shortness of breath in the past 30 days  -Confirmed no redness, rash, irritation, or yeast infection to groin area.   -Reviewed current visitor policy    Patient verbalized understanding of above and appreciated the call.

## 2024-02-19 ENCOUNTER — ANESTHESIA EVENT (OUTPATIENT)
Dept: MEDSURG UNIT | Facility: HOSPITAL | Age: 59
End: 2024-02-19
Payer: COMMERCIAL

## 2024-02-19 ENCOUNTER — HOSPITAL ENCOUNTER (OUTPATIENT)
Dept: CARDIOLOGY | Facility: HOSPITAL | Age: 59
Discharge: HOME | End: 2024-02-19
Attending: INTERNAL MEDICINE | Admitting: INTERNAL MEDICINE
Payer: COMMERCIAL

## 2024-02-19 ENCOUNTER — ANESTHESIA (OUTPATIENT)
Dept: MEDSURG UNIT | Facility: HOSPITAL | Age: 59
End: 2024-02-19
Payer: COMMERCIAL

## 2024-02-19 ENCOUNTER — HOSPITAL ENCOUNTER (OUTPATIENT)
Facility: HOSPITAL | Age: 59
Discharge: HOME | End: 2024-02-20
Attending: INTERNAL MEDICINE | Admitting: INTERNAL MEDICINE
Payer: COMMERCIAL

## 2024-02-19 VITALS
WEIGHT: 176 LBS | HEIGHT: 65 IN | SYSTOLIC BLOOD PRESSURE: 116 MMHG | DIASTOLIC BLOOD PRESSURE: 69 MMHG | BODY MASS INDEX: 29.32 KG/M2

## 2024-02-19 DIAGNOSIS — I48.19 ATRIAL FIBRILLATION, PERSISTENT: Primary | ICD-10-CM

## 2024-02-19 DIAGNOSIS — I49.9 ARRHYTHMIA: ICD-10-CM

## 2024-02-19 DIAGNOSIS — I48.91 ATRIAL FIBRILLATION: ICD-10-CM

## 2024-02-19 DIAGNOSIS — I48.19 ATRIAL FIBRILLATION, PERSISTENT: ICD-10-CM

## 2024-02-19 LAB
ASCENDING AORTA: 3.6 CM
BSA FOR ECHO PROCEDURE: 1.91 M2
EJECTION FRACTION: 40 %
LAA PV: 60 CM/S
OHS QRS DURATION: 96 MS
OHS QTC CALCULATION: 449 MS
POC ACTIVATED CLOTTING TIME K: 152 SEC (ref 74–137)
POC ACTIVATED CLOTTING TIME K: 169 SEC (ref 74–137)
POC ACTIVATED CLOTTING TIME K: 347 SEC (ref 74–137)
POC ACTIVATED CLOTTING TIME K: 352 SEC (ref 74–137)
POC ACTIVATED CLOTTING TIME K: 358 SEC (ref 74–137)
POC ACTIVATED CLOTTING TIME K: 369 SEC (ref 74–137)
POC ACTIVATED CLOTTING TIME K: 390 SEC (ref 74–137)
POC ACTIVATED CLOTTING TIME K: 406 SEC (ref 74–137)
POC ACTIVATED CLOTTING TIME K: 412 SEC (ref 74–137)
SAMPLE: ABNORMAL
SINUS: 3.2 CM
STJ: 3.2 CM

## 2024-02-19 PROCEDURE — 27201423 OPTIME MED/SURG SUP & DEVICES STERILE SUPPLY: Performed by: INTERNAL MEDICINE

## 2024-02-19 PROCEDURE — C1732 CATH, EP, DIAG/ABL, 3D/VECT: HCPCS | Performed by: INTERNAL MEDICINE

## 2024-02-19 PROCEDURE — D9220A PRA ANESTHESIA: Mod: ANES,,, | Performed by: ANESTHESIOLOGY

## 2024-02-19 PROCEDURE — 37000008 HC ANESTHESIA 1ST 15 MINUTES: Performed by: INTERNAL MEDICINE

## 2024-02-19 PROCEDURE — 25000003 PHARM REV CODE 250: Performed by: NURSE ANESTHETIST, CERTIFIED REGISTERED

## 2024-02-19 PROCEDURE — C1769 GUIDE WIRE: HCPCS | Performed by: INTERNAL MEDICINE

## 2024-02-19 PROCEDURE — 63600175 PHARM REV CODE 636 W HCPCS: Performed by: NURSE PRACTITIONER

## 2024-02-19 PROCEDURE — C1766 INTRO/SHEATH,STRBLE,NON-PEEL: HCPCS | Performed by: INTERNAL MEDICINE

## 2024-02-19 PROCEDURE — 93655 ICAR CATH ABLTJ DSCRT ARRHYT: CPT | Performed by: INTERNAL MEDICINE

## 2024-02-19 PROCEDURE — D9220A PRA ANESTHESIA: Mod: CRNA,,, | Performed by: NURSE ANESTHETIST, CERTIFIED REGISTERED

## 2024-02-19 PROCEDURE — 63600175 PHARM REV CODE 636 W HCPCS: Performed by: NURSE ANESTHETIST, CERTIFIED REGISTERED

## 2024-02-19 PROCEDURE — 93010 ELECTROCARDIOGRAM REPORT: CPT | Mod: ,,, | Performed by: INTERNAL MEDICINE

## 2024-02-19 PROCEDURE — 99499 UNLISTED E&M SERVICE: CPT | Mod: ,,, | Performed by: INTERNAL MEDICINE

## 2024-02-19 PROCEDURE — 37000009 HC ANESTHESIA EA ADD 15 MINS: Performed by: INTERNAL MEDICINE

## 2024-02-19 PROCEDURE — C1730 CATH, EP, 19 OR FEW ELECT: HCPCS | Performed by: INTERNAL MEDICINE

## 2024-02-19 PROCEDURE — 92960 CARDIOVERSION ELECTRIC EXT: CPT | Mod: 59,,, | Performed by: INTERNAL MEDICINE

## 2024-02-19 PROCEDURE — 92960 CARDIOVERSION ELECTRIC EXT: CPT | Mod: 59 | Performed by: INTERNAL MEDICINE

## 2024-02-19 PROCEDURE — 93656 COMPRE EP EVAL ABLTJ ATR FIB: CPT | Mod: ,,, | Performed by: INTERNAL MEDICINE

## 2024-02-19 PROCEDURE — 93325 DOPPLER ECHO COLOR FLOW MAPG: CPT | Mod: 26,,, | Performed by: INTERNAL MEDICINE

## 2024-02-19 PROCEDURE — 93325 DOPPLER ECHO COLOR FLOW MAPG: CPT

## 2024-02-19 PROCEDURE — 63600175 PHARM REV CODE 636 W HCPCS: Performed by: INTERNAL MEDICINE

## 2024-02-19 PROCEDURE — 93320 DOPPLER ECHO COMPLETE: CPT | Mod: 26,,, | Performed by: INTERNAL MEDICINE

## 2024-02-19 PROCEDURE — 93655 ICAR CATH ABLTJ DSCRT ARRHYT: CPT | Mod: ,,, | Performed by: INTERNAL MEDICINE

## 2024-02-19 PROCEDURE — 25000003 PHARM REV CODE 250: Performed by: NURSE PRACTITIONER

## 2024-02-19 PROCEDURE — C1894 INTRO/SHEATH, NON-LASER: HCPCS | Performed by: INTERNAL MEDICINE

## 2024-02-19 PROCEDURE — 25000003 PHARM REV CODE 250: Performed by: INTERNAL MEDICINE

## 2024-02-19 PROCEDURE — 93005 ELECTROCARDIOGRAM TRACING: CPT | Mod: 59

## 2024-02-19 PROCEDURE — 93312 ECHO TRANSESOPHAGEAL: CPT | Mod: 26,,, | Performed by: INTERNAL MEDICINE

## 2024-02-19 PROCEDURE — C1753 CATH, INTRAVAS ULTRASOUND: HCPCS | Performed by: INTERNAL MEDICINE

## 2024-02-19 PROCEDURE — 93656 COMPRE EP EVAL ABLTJ ATR FIB: CPT | Performed by: INTERNAL MEDICINE

## 2024-02-19 RX ORDER — MONTELUKAST SODIUM 10 MG/1
10 TABLET ORAL DAILY
Status: DISCONTINUED | OUTPATIENT
Start: 2024-02-20 | End: 2024-02-20 | Stop reason: HOSPADM

## 2024-02-19 RX ORDER — LIDOCAINE HYDROCHLORIDE 20 MG/ML
INJECTION, SOLUTION INFILTRATION; PERINEURAL
Status: DISCONTINUED | OUTPATIENT
Start: 2024-02-19 | End: 2024-02-19

## 2024-02-19 RX ORDER — LIDOCAINE HYDROCHLORIDE 20 MG/ML
INJECTION INTRAVENOUS
Status: DISCONTINUED | OUTPATIENT
Start: 2024-02-19 | End: 2024-02-19

## 2024-02-19 RX ORDER — HEPARIN SODIUM 1000 [USP'U]/ML
INJECTION, SOLUTION INTRAVENOUS; SUBCUTANEOUS
Status: DISCONTINUED | OUTPATIENT
Start: 2024-02-19 | End: 2024-02-19

## 2024-02-19 RX ORDER — CARVEDILOL 6.25 MG/1
6.25 TABLET ORAL 2 TIMES DAILY
Status: DISCONTINUED | OUTPATIENT
Start: 2024-02-19 | End: 2024-02-20 | Stop reason: HOSPADM

## 2024-02-19 RX ORDER — DEXAMETHASONE SODIUM PHOSPHATE 4 MG/ML
INJECTION, SOLUTION INTRA-ARTICULAR; INTRALESIONAL; INTRAMUSCULAR; INTRAVENOUS; SOFT TISSUE
Status: DISCONTINUED | OUTPATIENT
Start: 2024-02-19 | End: 2024-02-19

## 2024-02-19 RX ORDER — PROPOFOL 10 MG/ML
VIAL (ML) INTRAVENOUS
Status: DISCONTINUED | OUTPATIENT
Start: 2024-02-19 | End: 2024-02-19

## 2024-02-19 RX ORDER — FENTANYL CITRATE 50 UG/ML
25 INJECTION, SOLUTION INTRAMUSCULAR; INTRAVENOUS EVERY 5 MIN PRN
Status: DISCONTINUED | OUTPATIENT
Start: 2024-02-19 | End: 2024-02-19

## 2024-02-19 RX ORDER — AMIODARONE HYDROCHLORIDE 200 MG/1
200 TABLET ORAL DAILY
COMMUNITY

## 2024-02-19 RX ORDER — POTASSIUM CHLORIDE 20 MEQ/1
20 TABLET, EXTENDED RELEASE ORAL 2 TIMES DAILY
Status: DISCONTINUED | OUTPATIENT
Start: 2024-02-19 | End: 2024-02-20 | Stop reason: HOSPADM

## 2024-02-19 RX ORDER — PROTAMINE SULFATE 10 MG/ML
INJECTION, SOLUTION INTRAVENOUS
Status: DISCONTINUED | OUTPATIENT
Start: 2024-02-19 | End: 2024-02-19

## 2024-02-19 RX ORDER — SUCCINYLCHOLINE CHLORIDE 20 MG/ML
INJECTION INTRAMUSCULAR; INTRAVENOUS
Status: DISCONTINUED | OUTPATIENT
Start: 2024-02-19 | End: 2024-02-19

## 2024-02-19 RX ORDER — PANTOPRAZOLE SODIUM 40 MG/1
40 TABLET, DELAYED RELEASE ORAL DAILY
Status: DISCONTINUED | OUTPATIENT
Start: 2024-02-19 | End: 2024-02-20 | Stop reason: HOSPADM

## 2024-02-19 RX ORDER — ONDANSETRON HYDROCHLORIDE 2 MG/ML
INJECTION, SOLUTION INTRAVENOUS
Status: DISCONTINUED | OUTPATIENT
Start: 2024-02-19 | End: 2024-02-19

## 2024-02-19 RX ORDER — GABAPENTIN 100 MG/1
200 CAPSULE ORAL NIGHTLY
Status: DISCONTINUED | OUTPATIENT
Start: 2024-02-19 | End: 2024-02-20 | Stop reason: HOSPADM

## 2024-02-19 RX ORDER — DULOXETIN HYDROCHLORIDE 60 MG/1
60 CAPSULE, DELAYED RELEASE ORAL DAILY
Status: DISCONTINUED | OUTPATIENT
Start: 2024-02-20 | End: 2024-02-20 | Stop reason: HOSPADM

## 2024-02-19 RX ORDER — HYDROXYCHLOROQUINE SULFATE 200 MG/1
200 TABLET, FILM COATED ORAL 2 TIMES DAILY
Status: DISCONTINUED | OUTPATIENT
Start: 2024-02-19 | End: 2024-02-20 | Stop reason: HOSPADM

## 2024-02-19 RX ORDER — PROCHLORPERAZINE EDISYLATE 5 MG/ML
5 INJECTION INTRAMUSCULAR; INTRAVENOUS EVERY 30 MIN PRN
Status: DISCONTINUED | OUTPATIENT
Start: 2024-02-19 | End: 2024-02-19

## 2024-02-19 RX ORDER — MIDAZOLAM HYDROCHLORIDE 1 MG/ML
INJECTION, SOLUTION INTRAMUSCULAR; INTRAVENOUS
Status: DISCONTINUED | OUTPATIENT
Start: 2024-02-19 | End: 2024-02-19

## 2024-02-19 RX ORDER — FENTANYL CITRATE 50 UG/ML
INJECTION, SOLUTION INTRAMUSCULAR; INTRAVENOUS
Status: DISCONTINUED | OUTPATIENT
Start: 2024-02-19 | End: 2024-02-19

## 2024-02-19 RX ORDER — ROCURONIUM BROMIDE 10 MG/ML
INJECTION, SOLUTION INTRAVENOUS
Status: DISCONTINUED | OUTPATIENT
Start: 2024-02-19 | End: 2024-02-19

## 2024-02-19 RX ORDER — ACETAMINOPHEN 325 MG/1
650 TABLET ORAL EVERY 4 HOURS PRN
Status: DISCONTINUED | OUTPATIENT
Start: 2024-02-19 | End: 2024-02-20 | Stop reason: HOSPADM

## 2024-02-19 RX ORDER — GABAPENTIN 100 MG/1
200 CAPSULE ORAL NIGHTLY
COMMUNITY

## 2024-02-19 RX ORDER — LEFLUNOMIDE 20 MG/1
20 TABLET ORAL DAILY
Status: DISCONTINUED | OUTPATIENT
Start: 2024-02-20 | End: 2024-02-20 | Stop reason: HOSPADM

## 2024-02-19 RX ORDER — ACETAMINOPHEN 325 MG/1
650 TABLET ORAL EVERY 4 HOURS PRN
Status: DISCONTINUED | OUTPATIENT
Start: 2024-02-19 | End: 2024-02-19

## 2024-02-19 RX ORDER — HEPARIN SOD,PORCINE/0.9 % NACL 1000/500ML
INTRAVENOUS SOLUTION INTRAVENOUS
Status: DISCONTINUED | OUTPATIENT
Start: 2024-02-19 | End: 2024-02-19

## 2024-02-19 RX ORDER — CEFAZOLIN SODIUM 1 G/3ML
INJECTION, POWDER, FOR SOLUTION INTRAMUSCULAR; INTRAVENOUS
Status: DISCONTINUED | OUTPATIENT
Start: 2024-02-19 | End: 2024-02-19

## 2024-02-19 RX ORDER — HEPARIN SODIUM 10000 [USP'U]/100ML
INJECTION, SOLUTION INTRAVENOUS CONTINUOUS PRN
Status: DISCONTINUED | OUTPATIENT
Start: 2024-02-19 | End: 2024-02-19

## 2024-02-19 RX ORDER — VERAPAMIL HYDROCHLORIDE 2.5 MG/ML
INJECTION, SOLUTION INTRAVENOUS
Status: DISCONTINUED | OUTPATIENT
Start: 2024-02-19 | End: 2024-02-19

## 2024-02-19 RX ORDER — FUROSEMIDE 40 MG/1
40 TABLET ORAL DAILY
Status: COMPLETED | OUTPATIENT
Start: 2024-02-19 | End: 2024-02-19

## 2024-02-19 RX ORDER — ATORVASTATIN CALCIUM 20 MG/1
20 TABLET, FILM COATED ORAL DAILY
Status: DISCONTINUED | OUTPATIENT
Start: 2024-02-19 | End: 2024-02-20 | Stop reason: HOSPADM

## 2024-02-19 RX ADMIN — ROCURONIUM BROMIDE 5 MG: 10 INJECTION INTRAVENOUS at 10:02

## 2024-02-19 RX ADMIN — DEXAMETHASONE SODIUM PHOSPHATE 4 MG: 4 INJECTION, SOLUTION INTRAMUSCULAR; INTRAVENOUS at 10:02

## 2024-02-19 RX ADMIN — LIDOCAINE HYDROCHLORIDE 60 MG: 20 INJECTION INTRAVENOUS at 10:02

## 2024-02-19 RX ADMIN — CEFAZOLIN 2 G: 330 INJECTION, POWDER, FOR SOLUTION INTRAMUSCULAR; INTRAVENOUS at 03:02

## 2024-02-19 RX ADMIN — POTASSIUM CHLORIDE 20 MEQ: 1500 TABLET, EXTENDED RELEASE ORAL at 09:02

## 2024-02-19 RX ADMIN — HEPARIN SODIUM 12000 UNITS: 1000 INJECTION, SOLUTION INTRAVENOUS; SUBCUTANEOUS at 12:02

## 2024-02-19 RX ADMIN — SODIUM CHLORIDE: 9 INJECTION, SOLUTION INTRAVENOUS at 10:02

## 2024-02-19 RX ADMIN — ACETAMINOPHEN 650 MG: 325 TABLET ORAL at 09:02

## 2024-02-19 RX ADMIN — CARVEDILOL 6.25 MG: 6.25 TABLET, FILM COATED ORAL at 09:02

## 2024-02-19 RX ADMIN — PROPOFOL 50 MG: 10 INJECTION, EMULSION INTRAVENOUS at 12:02

## 2024-02-19 RX ADMIN — PROTAMINE SULFATE 60 MG: 10 INJECTION, SOLUTION INTRAVENOUS at 03:02

## 2024-02-19 RX ADMIN — ONDANSETRON 4 MG: 2 INJECTION INTRAMUSCULAR; INTRAVENOUS at 03:02

## 2024-02-19 RX ADMIN — SUCCINYLCHOLINE CHLORIDE 120 MG: 20 INJECTION, SOLUTION INTRAMUSCULAR; INTRAVENOUS at 10:02

## 2024-02-19 RX ADMIN — FUROSEMIDE 40 MG: 40 TABLET ORAL at 09:02

## 2024-02-19 RX ADMIN — AMIODARONE HYDROCHLORIDE 1 MG/MIN: 1.8 INJECTION, SOLUTION INTRAVENOUS at 06:02

## 2024-02-19 RX ADMIN — VERAPAMIL HYDROCHLORIDE 5 MG: 2.5 INJECTION, SOLUTION INTRAVENOUS at 03:02

## 2024-02-19 RX ADMIN — AMIODARONE HYDROCHLORIDE 150 MG: 1.5 INJECTION, SOLUTION INTRAVENOUS at 05:02

## 2024-02-19 RX ADMIN — ACETAMINOPHEN 650 MG: 325 TABLET ORAL at 04:02

## 2024-02-19 RX ADMIN — PROPOFOL 150 MG: 10 INJECTION, EMULSION INTRAVENOUS at 10:02

## 2024-02-19 RX ADMIN — MIDAZOLAM HYDROCHLORIDE 1 MG: 1 INJECTION, SOLUTION INTRAMUSCULAR; INTRAVENOUS at 10:02

## 2024-02-19 RX ADMIN — HEPARIN SODIUM AND DEXTROSE 12 UNITS/KG/HR: 10000; 5 INJECTION INTRAVENOUS at 12:02

## 2024-02-19 RX ADMIN — FENTANYL CITRATE 75 MCG: 50 INJECTION, SOLUTION INTRAMUSCULAR; INTRAVENOUS at 10:02

## 2024-02-19 RX ADMIN — SACUBITRIL AND VALSARTAN 1 TABLET: 24; 26 TABLET, FILM COATED ORAL at 09:02

## 2024-02-19 RX ADMIN — GABAPENTIN 200 MG: 100 CAPSULE ORAL at 09:02

## 2024-02-19 RX ADMIN — HEPARIN SODIUM 3000 UNITS: 1000 INJECTION, SOLUTION INTRAVENOUS; SUBCUTANEOUS at 02:02

## 2024-02-19 RX ADMIN — CEFAZOLIN 2 G: 330 INJECTION, POWDER, FOR SOLUTION INTRAMUSCULAR; INTRAVENOUS at 11:02

## 2024-02-19 RX ADMIN — APIXABAN 5 MG: 5 TABLET, FILM COATED ORAL at 09:02

## 2024-02-19 RX ADMIN — SODIUM CHLORIDE 0.3 MCG/KG/MIN: 9 INJECTION, SOLUTION INTRAVENOUS at 11:02

## 2024-02-19 RX ADMIN — FENTANYL CITRATE 25 MCG: 50 INJECTION, SOLUTION INTRAMUSCULAR; INTRAVENOUS at 11:02

## 2024-02-19 NOTE — H&P
[facility] - Short Stay Cardiac Unit  Cardiac Electrophysiology  History and Physical     Admission Date: 2/19/2024  Code Status: No Order   Attending Provider: Jose Alejandro Fernandes MD   Principal Problem:Atrial fibrillation, persistent    Subjective:     Chief Complaint:  Atrial fibrillation, persistent     HPI: Ms. Estrada is a 58 year old female with a PMHx of  SLE with anticardiolipin Ab, Hx coronary spasm, on CCB, cath x2 reportedly negative, NICM 40%, hx WCT, s/p DCCV in the field and subsequent ICD, Persistent AF, CVA 2019 without residual, HL, hypothyroid, +FHx fo SCD: brother (CHF) and son (age 13, while playing video game)    History obtained through patient report and clinic notes:     Didn't tolerate multaq for AF.  In setting of recently starting propafenone for AF Rx, developed a WCT at home.   EMS ECG showed WCT with RBBB, R sup axis, transition V4. No Syncope (though nearly so); DCCV'd in the field. [place] better when back to NSR. ICD implanted by Dr Yoo in [place].     AF was DCCV'd 10/2/23 as well. She feels fatigued all the time. SOB. Hard to walk without ASTORGA. GDMT is being titrated by primary cardiologist Dr. Jett ([place]).     cMRI: no scar. 43% LVEF.  echo 2023: 40%  event monitor: SR, 9% PVCs (multifocal)  Stress test 1/2019 neg     Update 1/2024:  Feeling well today. Still gets symptomatic AF.  Her device shows 11% AF despite amiodarone. RVP 8%  ECG is APVS.   Complex case.  WCT most likely VT. Not likely SVT with aberrancy, despite propafenone, as the morphology is not typical for that.  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  Persistent AF     CM  Dr. Jett has begun GDMT; defer titration to them.  PET stress was negative.  Genetic screen for familial CM. (pending)     2. Discussed AF and its basic pathophysiology, including its health implications and treatment options (rate vs. rhythm control, meds vs. procedural/device treatment) as appropriate for the patient.  d/c amio today in prep for PVI.  All questions were answered. Patient understands and wishes to proceed.  Plan RF PVI following amio washout.    Ms. Estrada presents today to [facility] for scheduled PVI with Dr. Fernandes. She denies any chest pain, palpitations, SOB, dizziness, light headedness, weakness, syncope, or near syncopal episodes. She does note ASTORGA and LE swelling. She denies any bleeding, infections, fevers, rashes, or surgeries in the past 30 days. She is currently taking eliquis 5 mg BID (last dose taken yesterday PM), carvedilol 6.25 mg BID, entresto and lasix 40 mg daily.    ECG today shows Atrial paced rhythm at 70 bpm  ms QRS 96 ms QT/Qtc 416/449 ms.    Past Medical History:   Diagnosis Date    ADD (attention deficit disorder)     Anxiety disorder, unspecified     BMI 28.0-28.9,adult     Cardiomyopathy     CHF (congestive heart failure)     Chronic constipation     Depression     Essential (primary) hypertension     GERD (gastroesophageal reflux disease)     High cholesterol     Mild intermittent asthma, uncomplicated     Systemic lupus erythematosus, organ or system involvement unspecified     Unspecified urinary incontinence     Vasospasm, coronary:  and  MI but no obstructive CAD     Venous insufficiency of leg      Past Surgical History:   Procedure Laterality Date    ABDOMINOPLASTY  2017    AUGMENTATION OF BREAST       SECTION      COLONOSCOPY      use adult colonoscope    HYSTERECTOMY      pelvic floor reconstruction        Review of patient's allergies indicates:   Allergen Reactions    Sulfa (sulfonamide antibiotics) Swelling and Rash     Lips swelling     Benzoyl peroxide     Benzoyl peroxide Hives    Botox cosmetic [onabotulinumtoxina (cosmetic)]     Botox [onabotulinumtoxina] Hives    Sulfa (sulfonamide antibiotics)      No current facility-administered medications on file prior to encounter.     Current Outpatient Medications on File Prior to Encounter   Medication Sig    albuterol (PROVENTIL)  2.5 mg /3 mL (0.083 %) nebulizer solution Take 2.5 mg by nebulization every 6 (six) hours as needed. Rescue    albuterol (PROVENTIL/VENTOLIN HFA) 90 mcg/actuation inhaler use 2 sprays BY MOUTH EVERY FOUR HOURS AS DIRECTED AS NEEDED FOR BREATHING    atorvastatin (LIPITOR) 20 MG tablet TAKE ONE TABLET BY MOUTH ONE TIME A DAY AS DIRECTED FOR CHOLESTEROL    carvediloL (COREG) 6.25 MG tablet Take 1 tablet (6.25 mg total) by mouth 2 (two) times daily.    DULoxetine (CYMBALTA) 60 MG capsule TAKE ONE CAPSULE BY MOUTH ONE TIME A DAY AS DIRECTED    ELIQUIS 5 mg Tab Take 5 mg by mouth 2 (two) times daily.    ENTRESTO 24-26 mg per tablet Take 1 tablet by mouth 2 (two) times daily.    furosemide (LASIX) 40 MG tablet TAKE ONE TABLET BY MOUTH TWO TIMES A DAY AS DIRECTED AS NEEDED FOR FLUID    gabapentin (NEURONTIN) 100 MG capsule Take 200 mg by mouth every evening.    hydrOXYchloroQUINE (PLAQUENIL) 200 mg tablet TAKE ONE TABLET BY MOUTH TWO TIMES A DAY AS DIRECTED FOR ARTHRITIS    leflunomide (ARAVA) 20 MG Tab TAKE ONE TABLET BY MOUTH ONE TIME A DAY AS DIRECTED FOR ARTHRITIS    levothyroxine (SYNTHROID) 125 MCG tablet TAKE ONE TABLET BY MOUTH ONE TIME A DAY IN THE MORNING on empty stomach AS DIRECTED FOR THYROID SUPPLEMENT    montelukast (SINGULAIR) 10 mg tablet Take 10 mg by mouth once daily.    montelukast (SINGULAIR) 10 mg tablet TAKE ONE TABLET BY MOUTH ONE TIME A DAY AS DIRECTED FOR ALLERGIES AND ASTHMA    MULTIVIT,MIN52-FOLIC-VITK-CQ10 ORAL Take by mouth.    omega-3 fatty acids/fish oil (FISH OIL-OMEGA-3 FATTY ACIDS) 300-1,000 mg capsule Take by mouth once daily.    potassium chloride SA (K-DUR,KLOR-CON) 20 MEQ tablet TAKE ONE TABLET BY MOUTH TWO TIMES A DAY AS DIRECTED FOR POTASSIUM SUPPLEMENT    soy isofl/blk coh/gr tea/yerba (ESTROVEN ENERGY ORAL) Take 1 tablet by mouth Daily.    TRELEGY ELLIPTA 200-62.5-25 mcg inhaler INHALE ONE INHALATION BY MOUTH ONE TIME A DAY AS DIRECTED FOR BREATHING    amiodarone (PACERONE) 200 MG  Tab Take 200 mg by mouth once daily.    aspirin (ECOTRIN) 81 MG EC tablet Take 81 mg by mouth 2 (two) times a day.    BENLYSTA 200 mg/mL AtIn INJECT ONE PEN INJECTOR SUBCUTANEOUSLY ONCE A WEEK    denosumab (PROLIA) 60 mg/mL Syrg Inject 60 mg into the skin every 6 (six) months.    DULoxetine (CYMBALTA) 60 MG capsule Take 60 mg by mouth once daily.    fluticasone-umeclidin-vilanter (TRELEGY ELLIPTA) 200-62.5-25 mcg inhaler Inhale 1 puff into the lungs once daily.    furosemide (LASIX) 40 MG tablet Take 40 mg by mouth once daily.    hydrOXYchloroQUINE (PLAQUENIL) 200 mg tablet Take 200 mg by mouth 2 (two) times a day.    levothyroxine (SYNTHROID) 125 MCG tablet Take 125 mcg by mouth before breakfast.    lisdexamfetamine (VYVANSE) 60 MG capsule Take 60 mg by mouth every morning.    loratadine (CLARITIN) 10 mg tablet Take 10 mg by mouth once daily.    lovastatin (MEVACOR) 20 MG tablet Take 20 mg by mouth once daily.    pantoprazole (PROTONIX) 20 MG tablet TAKE ONE TABLET BY MOUTH ONE TIME A DAY AS DIRECTED FOR ESOPHAGUS    pantoprazole (PROTONIX) 40 MG tablet Take 40 mg by mouth once daily.    predniSONE (DELTASONE) 2.5 MG tablet TAKE TWO TABLETS BY MOUTH ONE TIME A DAY AS DIRECTED FOR INFLAMMATION    sod sulf-pot chloride-mag sulf (SUTAB) 1.479-0.188- 0.225 gram tablet Take 12 tablets by mouth once daily. Take according to package instructions with indicated amount of water. No breakfast day before test. (Patient not taking: Reported on 1/5/2024)    sod sulf-pot chloride-mag sulf (SUTAB) 1.479-0.188- 0.225 gram tablet Take 12 tablets by mouth once daily. Take according to package instructions with indicated amount of water. No breakfast day before test. (Patient not taking: Reported on 1/5/2024)    sod sulf-pot chloride-mag sulf (SUTAB) 1.479-0.188- 0.225 gram tablet Take 12 tablets by mouth once daily. Take according to package instructions with indicated amount of water. No breakfast day before test. May substitute  with Suprep, Clenpiq, Plenvu, Moviprep or GoLytely based on Rx plan and patient preference. (Patient not taking: Reported on 1/5/2024)    VYVANSE 60 mg capsule TAKE ONE CAPSULE BY MOUTH ONE TIME A DAY AS DIRECTED for attention deficit     Family History       Problem Relation (Age of Onset)    Colon cancer Father (72)    Colonic polyp Brother    Heart disease Mother (72)    Heart failure Son (13)    No Known Problems Maternal Grandmother, Maternal Grandfather, Paternal Grandmother, Paternal Grandfather          Tobacco Use    Smoking status: Never    Smokeless tobacco: Never   Substance and Sexual Activity    Alcohol use: Never     Comment: rare    Drug use: Never    Sexual activity: Not on file     Review of Systems   Constitutional: Negative for chills, fever and malaise/fatigue.   HENT:  Negative for congestion and nosebleeds.    Eyes:  Negative for blurred vision.   Cardiovascular:  Negative for chest pain, irregular heartbeat, leg swelling, near-syncope, orthopnea, palpitations, paroxysmal nocturnal dyspnea and syncope. LE swelling. Left chest wall device. Dyspnea on exertion.  Respiratory:  Negative for cough, hemoptysis, shortness of breath, sleep disturbances due to breathing, sputum production and wheezing.    Endocrine: Negative for polyphagia.   Hematologic/Lymphatic: Negative for bleeding problem. Does not bruise/bleed easily.   Skin:  Negative for itching and rash.   Musculoskeletal:  Negative for back pain, joint swelling, muscle cramps and muscle weakness.   Gastrointestinal:  Negative for bloating, abdominal pain, hematemesis, hematochezia, nausea and vomiting.   Genitourinary:  Negative for dysuria and hematuria.   Neurological:  Negative for dizziness, focal weakness, headaches, light-headedness, loss of balance, numbness and weakness.   Psychiatric/Behavioral:  Negative for altered mental status.      Objective:     Vital Signs (Most Recent):  Pulse: 70 (02/19/24 0840)  Resp: 14 (02/19/24  0840)  BP: 116/69 (02/19/24 0845)  SpO2: (!) 94 % (02/19/24 0840) Vital Signs (24h Range):  Pulse:  [70] 70  Resp:  [14] 14  SpO2:  [94 %] 94 %  BP: (113-116)/(63-69) 116/69     There is no height or weight on file to calculate BMI.    SpO2: (!) 94 %     Physical Exam  Vitals and nursing note reviewed.   Constitutional:       General: She is not in acute distress.     Appearance: Normal appearance. She is well-developed. She is not diaphoretic.   HENT:      Head: Normocephalic and atraumatic.      Mouth/Throat:      Mouth: Mucous membranes are moist.      Pharynx: No oropharyngeal exudate.   Eyes:      Conjunctiva/sclera: Conjunctivae normal.      Pupils: Pupils are equal, round, and reactive to light.   Cardiovascular:      Rate and Rhythm: Normal rate and regular rhythm. No extrasystoles are present.     Pulses: Normal pulses and intact distal pulses.           Radial pulses are 2+ on the right side and 2+ on the left side.        Dorsalis pedis pulses are 2+ on the right side and 2+ on the left side.      Heart sounds: Normal heart sounds, S1 normal and S2 normal.      Comments: Left chest wall device site in good condition.  Bilateral LE edema +1  Pulmonary:      Effort: Pulmonary effort is normal. No accessory muscle usage or respiratory distress.      Breath sounds: Normal breath sounds. No decreased breath sounds, wheezing, rhonchi or rales.   Chest:      Chest wall: No tenderness.   Abdominal:      General: Bowel sounds are normal. There is no distension.      Palpations: Abdomen is soft.      Tenderness: There is no abdominal tenderness. There is no guarding.   Musculoskeletal:         General: Normal range of motion.      Cervical back: Normal range of motion and neck supple.   Skin:     General: Skin is warm and dry.      Findings: No erythema or rash.   Neurological:      Mental Status: She is alert and oriented to person, place, and time. She is not disoriented.      Sensory: No sensory deficit.       Motor: No abnormal muscle tone.      Coordination: Coordination normal.      Gait: Gait normal.   Psychiatric:         Mood and Affect: Mood normal.         Behavior: Behavior normal.         Thought Content: Thought content normal.         Judgment: Judgment normal.     Significant Labs:  Pre procedure labs from 2/6/24 reviewed    Significant Imaging:  ECG today shows Atrial paced rhythm at 70 bpm  ms QRS 96 ms QT/Qtc 416/449 ms.  Assessment and Plan:   Plan:  -PVI  -MÓNICA prior  -Anesthesia for sedation    Prior to procedure, Dr. Fernandes at bedside speaking with patient and family. Extensive discussion with patient regarding the nature of RFA PVI including transseptal puncture. We discussed risks and benefits at length. Our discussion included, but was not limited to the risk of death, infection, bleeding, stroke, MI, cardiac perforation, embolism, cardiac tamponade, vascular injury, AE fistula, injury to phrenic nerve, pulmonary vein stenosis and other organic injury including the possibility for need for surgery or pacemaker implantation. We discussed success rates and possible need for redo procedures. Patient verbalized understanding. All questions answered, no further questions or concerns, patient would like to proceed. Consents signed.    Lynda Haji NP  Cardiac Electrophysiology  [facility]    Attending: Jose Alejandro Fernandes MD

## 2024-02-19 NOTE — ANESTHESIA PREPROCEDURE EVALUATION
[facility]  Anesthesia Pre-Operative Evaluation         Patient Name: MARY sEtrada  YOB: 1965  MRN: [id number]    SUBJECTIVE:     Pre-operative evaluation for Procedure(s) (LRB):  Ablation atrial fibrillation (N/A)  ECHOCARDIOGRAM, TRANSESOPHAGEAL (N/A)     02/19/2024    MARY Estrada is a 59 y.o. female w/ a significant PMHx of SLE, HFrEF-EF 35%-40%, a-fib, WCT s/p ICD, CVA, HLD, hypothroidism,  who presents for the above procedure.      LDA: None documented.    Prev airway: None documented.     Drips: None documented.    Patient Active Problem List   Diagnosis    NICM (nonischemic cardiomyopathy)    Wide-complex tachycardia    Atrial fibrillation, persistent    Hyperlipemia    Hypothyroid       Review of patient's allergies indicates:   Allergen Reactions    Sulfa (sulfonamide antibiotics) Swelling and Rash     Lips swelling     Benzoyl peroxide     Benzoyl peroxide Hives    Botox cosmetic [onabotulinumtoxina (cosmetic)]     Botox [onabotulinumtoxina] Hives    Sulfa (sulfonamide antibiotics)        Current Inpatient Medications:      No current facility-administered medications on file prior to encounter.     Current Outpatient Medications on File Prior to Encounter   Medication Sig Dispense Refill    albuterol (PROVENTIL) 2.5 mg /3 mL (0.083 %) nebulizer solution Take 2.5 mg by nebulization every 6 (six) hours as needed. Rescue      albuterol (PROVENTIL/VENTOLIN HFA) 90 mcg/actuation inhaler use 2 sprays BY MOUTH EVERY FOUR HOURS AS DIRECTED AS NEEDED FOR BREATHING      atorvastatin (LIPITOR) 20 MG tablet TAKE ONE TABLET BY MOUTH ONE TIME A DAY AS DIRECTED FOR CHOLESTEROL      carvediloL (COREG) 6.25 MG tablet Take 1 tablet (6.25 mg total) by mouth 2 (two) times daily. 180 tablet 3    DULoxetine (CYMBALTA) 60 MG capsule TAKE ONE CAPSULE BY MOUTH ONE TIME A DAY AS DIRECTED      ELIQUIS 5 mg Tab Take 5 mg by mouth 2 (two) times daily.      ENTRESTO 24-26 mg per tablet Take  1 tablet by mouth 2 (two) times daily.      furosemide (LASIX) 40 MG tablet TAKE ONE TABLET BY MOUTH TWO TIMES A DAY AS DIRECTED AS NEEDED FOR FLUID      gabapentin (NEURONTIN) 100 MG capsule Take 200 mg by mouth every evening.      hydrOXYchloroQUINE (PLAQUENIL) 200 mg tablet TAKE ONE TABLET BY MOUTH TWO TIMES A DAY AS DIRECTED FOR ARTHRITIS      leflunomide (ARAVA) 20 MG Tab TAKE ONE TABLET BY MOUTH ONE TIME A DAY AS DIRECTED FOR ARTHRITIS      levothyroxine (SYNTHROID) 125 MCG tablet TAKE ONE TABLET BY MOUTH ONE TIME A DAY IN THE MORNING on empty stomach AS DIRECTED FOR THYROID SUPPLEMENT      montelukast (SINGULAIR) 10 mg tablet Take 10 mg by mouth once daily.      montelukast (SINGULAIR) 10 mg tablet TAKE ONE TABLET BY MOUTH ONE TIME A DAY AS DIRECTED FOR ALLERGIES AND ASTHMA      MULTIVIT,MIN52-FOLIC-VITK-CQ10 ORAL Take by mouth.      omega-3 fatty acids/fish oil (FISH OIL-OMEGA-3 FATTY ACIDS) 300-1,000 mg capsule Take by mouth once daily.      potassium chloride SA (K-DUR,KLOR-CON) 20 MEQ tablet TAKE ONE TABLET BY MOUTH TWO TIMES A DAY AS DIRECTED FOR POTASSIUM SUPPLEMENT      soy isofl/blk coh/gr tea/yerba (ESTROVEN ENERGY ORAL) Take 1 tablet by mouth Daily.      TRELEGY ELLIPTA 200-62.5-25 mcg inhaler INHALE ONE INHALATION BY MOUTH ONE TIME A DAY AS DIRECTED FOR BREATHING      amiodarone (PACERONE) 200 MG Tab Take 200 mg by mouth once daily.      aspirin (ECOTRIN) 81 MG EC tablet Take 81 mg by mouth 2 (two) times a day.      BENLYSTA 200 mg/mL AtIn INJECT ONE PEN INJECTOR SUBCUTANEOUSLY ONCE A WEEK      denosumab (PROLIA) 60 mg/mL Syrg Inject 60 mg into the skin every 6 (six) months.      DULoxetine (CYMBALTA) 60 MG capsule Take 60 mg by mouth once daily.      fluticasone-umeclidin-vilanter (TRELEGY ELLIPTA) 200-62.5-25 mcg inhaler Inhale 1 puff into the lungs once daily.      furosemide (LASIX) 40 MG tablet Take 40 mg by mouth once daily.      hydrOXYchloroQUINE (PLAQUENIL) 200 mg tablet Take 200 mg by  mouth 2 (two) times a day.      levothyroxine (SYNTHROID) 125 MCG tablet Take 125 mcg by mouth before breakfast.      lisdexamfetamine (VYVANSE) 60 MG capsule Take 60 mg by mouth every morning.      loratadine (CLARITIN) 10 mg tablet Take 10 mg by mouth once daily.      lovastatin (MEVACOR) 20 MG tablet Take 20 mg by mouth once daily.      pantoprazole (PROTONIX) 20 MG tablet TAKE ONE TABLET BY MOUTH ONE TIME A DAY AS DIRECTED FOR ESOPHAGUS      pantoprazole (PROTONIX) 40 MG tablet Take 40 mg by mouth once daily.      predniSONE (DELTASONE) 2.5 MG tablet TAKE TWO TABLETS BY MOUTH ONE TIME A DAY AS DIRECTED FOR INFLAMMATION      sod sulf-pot chloride-mag sulf (SUTAB) 1.479-0.188- 0.225 gram tablet Take 12 tablets by mouth once daily. Take according to package instructions with indicated amount of water. No breakfast day before test. (Patient not taking: Reported on 2024) 24 tablet 0    sod sulf-pot chloride-mag sulf (SUTAB) 1.479-0.188- 0.225 gram tablet Take 12 tablets by mouth once daily. Take according to package instructions with indicated amount of water. No breakfast day before test. (Patient not taking: Reported on 2024) 24 tablet 0    sod sulf-pot chloride-mag sulf (SUTAB) 1.479-0.188- 0.225 gram tablet Take 12 tablets by mouth once daily. Take according to package instructions with indicated amount of water. No breakfast day before test. May substitute with Suprep, Clenpiq, Plenvu, Moviprep or GoLytely based on Rx plan and patient preference. (Patient not taking: Reported on 2024) 24 tablet 0    VYVANSE 60 mg capsule TAKE ONE CAPSULE BY MOUTH ONE TIME A DAY AS DIRECTED for attention deficit         Past Surgical History:   Procedure Laterality Date    ABDOMINOPLASTY  2017    AUGMENTATION OF BREAST       SECTION      COLONOSCOPY      use adult colonoscope    HYSTERECTOMY      pelvic floor reconstruction          Social History     Socioeconomic History    Marital status:   "  Occupational History    Occupation: MD   Tobacco Use    Smoking status: Never    Smokeless tobacco: Never   Substance and Sexual Activity    Alcohol use: Never     Comment: rare    Drug use: Never   Social History Narrative    ** Merged History Encounter **            OBJECTIVE:     Vital Signs Range (Last 24H):         CBC:   No results for input(s): "WBC", "RBC", "HGB", "HCT", "PLT", "MCV", "MCH", "MCHC" in the last 72 hours.    CMP: No results for input(s): "NA", "K", "CL", "CO2", "BUN", "CREATININE", "GLU", "MG", "PHOS", "CALCIUM", "ALBUMIN", "PROT", "ALKPHOS", "ALT", "AST", "BILITOT" in the last 72 hours.    INR:  No results for input(s): "PT", "INR", "PROTIME", "APTT" in the last 72 hours.    Diagnostic Studies: No relevant studies.    EKG:   No results found for this or any previous visit.     2D ECHO:   No results found for this or any previous visit.         ASSESSMENT/PLAN:           Pre-op Assessment    I have reviewed the Patient Summary Reports.     I have reviewed the Nursing Notes.    I have reviewed the Medications.     Review of Systems  Anesthesia Hx:  No problems with previous Anesthesia   History of prior surgery of interest to airway management or planning:          Denies Family Hx of Anesthesia complications.    Denies Personal Hx of Anesthesia complications.                    Hematology/Oncology:    Oncology Normal                                   Cardiovascular:      Denies Hypertension.    Dysrhythmias atrial fibrillation  Denies Angina. CHF    hyperlipidemia   ECG has been reviewed.                          Pulmonary:       Denies Shortness of breath.  Denies Recent URI.                 Renal/:  Renal/ Normal                 Hepatic/GI:      Denies GERD. Denies Liver Disease.            Neurological:    Denies CVA.    Denies Seizures.                                Endocrine:  Denies Diabetes. Hypothyroidism          Psych:  Psychiatric History anxiety depression        "         Physical Exam  General: Well nourished, Cooperative and Alert    Airway:  Mallampati: II   Mouth Opening: Normal  TM Distance: Normal  Tongue: Normal  Neck ROM: Normal ROM    Dental:  Intact        Anesthesia Plan  Type of Anesthesia, risks & benefits discussed:    Anesthesia Type: Gen ETT  Intra-op Monitoring Plan: Standard ASA Monitors and Art Line  Post Op Pain Control Plan: multimodal analgesia  Induction:  IV  Airway Plan: Direct, Post-Induction  Informed Consent: Informed consent signed with the Patient and all parties understand the risks and agree with anesthesia plan.  All questions answered.   ASA Score: 3  Day of Surgery Review of History & Physical: H&P Update referred to the surgeon/provider.    Ready For Surgery From Anesthesia Perspective.     .

## 2024-02-19 NOTE — NURSING
Pt prepped in room 3 on [facility]SCU. Pt's vs taken and wnl. Pt is free from s/s of distress and intolerable pain. Pt's family at bedside. 2 ivs placed, 12 lead EKG completed, sites clipped. Pre procedure questions completed. Pt signed procedural consents at office appointment and no RN signed as a witness. REBECCA Haji verified signature on procedural consent was pt's and NP answered all questions/concerns. This RN signed as witness for procedural consent. Pt needs MÓNICA consent signed.

## 2024-02-19 NOTE — SUBJECTIVE & OBJECTIVE
Past Medical History:   Diagnosis Date    ADD (attention deficit disorder)     Anxiety disorder, unspecified     BMI 28.0-28.9,adult     Cardiomyopathy     CHF (congestive heart failure)     Chronic constipation     Depression     Essential (primary) hypertension     GERD (gastroesophageal reflux disease)     High cholesterol     Mild intermittent asthma, uncomplicated     Systemic lupus erythematosus, organ or system involvement unspecified     Unspecified urinary incontinence     Vasospasm, coronary:  and  MI but no obstructive CAD     Venous insufficiency of leg        Past Surgical History:   Procedure Laterality Date    ABDOMINOPLASTY  2017    AUGMENTATION OF BREAST       SECTION      COLONOSCOPY      use adult colonoscope    HYSTERECTOMY      pelvic floor reconstruction          Review of patient's allergies indicates:   Allergen Reactions    Sulfa (sulfonamide antibiotics) Swelling and Rash     Lips swelling     Benzoyl peroxide     Benzoyl peroxide Hives    Botox cosmetic [onabotulinumtoxina (cosmetic)]     Botox [onabotulinumtoxina] Hives    Sulfa (sulfonamide antibiotics)        No current facility-administered medications on file prior to encounter.     Current Outpatient Medications on File Prior to Encounter   Medication Sig    albuterol (PROVENTIL) 2.5 mg /3 mL (0.083 %) nebulizer solution Take 2.5 mg by nebulization every 6 (six) hours as needed. Rescue    albuterol (PROVENTIL/VENTOLIN HFA) 90 mcg/actuation inhaler use 2 sprays BY MOUTH EVERY FOUR HOURS AS DIRECTED AS NEEDED FOR BREATHING    atorvastatin (LIPITOR) 20 MG tablet TAKE ONE TABLET BY MOUTH ONE TIME A DAY AS DIRECTED FOR CHOLESTEROL    carvediloL (COREG) 6.25 MG tablet Take 1 tablet (6.25 mg total) by mouth 2 (two) times daily.    DULoxetine (CYMBALTA) 60 MG capsule TAKE ONE CAPSULE BY MOUTH ONE TIME A DAY AS DIRECTED    ELIQUIS 5 mg Tab Take 5 mg by mouth 2 (two) times daily.    ENTRESTO 24-26 mg per tablet Take 1  tablet by mouth 2 (two) times daily.    furosemide (LASIX) 40 MG tablet TAKE ONE TABLET BY MOUTH TWO TIMES A DAY AS DIRECTED AS NEEDED FOR FLUID    gabapentin (NEURONTIN) 100 MG capsule Take 200 mg by mouth every evening.    hydrOXYchloroQUINE (PLAQUENIL) 200 mg tablet TAKE ONE TABLET BY MOUTH TWO TIMES A DAY AS DIRECTED FOR ARTHRITIS    leflunomide (ARAVA) 20 MG Tab TAKE ONE TABLET BY MOUTH ONE TIME A DAY AS DIRECTED FOR ARTHRITIS    levothyroxine (SYNTHROID) 125 MCG tablet TAKE ONE TABLET BY MOUTH ONE TIME A DAY IN THE MORNING on empty stomach AS DIRECTED FOR THYROID SUPPLEMENT    montelukast (SINGULAIR) 10 mg tablet Take 10 mg by mouth once daily.    montelukast (SINGULAIR) 10 mg tablet TAKE ONE TABLET BY MOUTH ONE TIME A DAY AS DIRECTED FOR ALLERGIES AND ASTHMA    MULTIVIT,MIN52-FOLIC-VITK-CQ10 ORAL Take by mouth.    omega-3 fatty acids/fish oil (FISH OIL-OMEGA-3 FATTY ACIDS) 300-1,000 mg capsule Take by mouth once daily.    potassium chloride SA (K-DUR,KLOR-CON) 20 MEQ tablet TAKE ONE TABLET BY MOUTH TWO TIMES A DAY AS DIRECTED FOR POTASSIUM SUPPLEMENT    soy isofl/blk coh/gr tea/yerba (ESTROVEN ENERGY ORAL) Take 1 tablet by mouth Daily.    TRELEGY ELLIPTA 200-62.5-25 mcg inhaler INHALE ONE INHALATION BY MOUTH ONE TIME A DAY AS DIRECTED FOR BREATHING    amiodarone (PACERONE) 200 MG Tab Take 200 mg by mouth once daily.    aspirin (ECOTRIN) 81 MG EC tablet Take 81 mg by mouth 2 (two) times a day.    BENLYSTA 200 mg/mL AtIn INJECT ONE PEN INJECTOR SUBCUTANEOUSLY ONCE A WEEK    denosumab (PROLIA) 60 mg/mL Syrg Inject 60 mg into the skin every 6 (six) months.    DULoxetine (CYMBALTA) 60 MG capsule Take 60 mg by mouth once daily.    fluticasone-umeclidin-vilanter (TRELEGY ELLIPTA) 200-62.5-25 mcg inhaler Inhale 1 puff into the lungs once daily.    furosemide (LASIX) 40 MG tablet Take 40 mg by mouth once daily.    hydrOXYchloroQUINE (PLAQUENIL) 200 mg tablet Take 200 mg by mouth 2 (two) times a day.    levothyroxine  (SYNTHROID) 125 MCG tablet Take 125 mcg by mouth before breakfast.    lisdexamfetamine (VYVANSE) 60 MG capsule Take 60 mg by mouth every morning.    loratadine (CLARITIN) 10 mg tablet Take 10 mg by mouth once daily.    lovastatin (MEVACOR) 20 MG tablet Take 20 mg by mouth once daily.    pantoprazole (PROTONIX) 20 MG tablet TAKE ONE TABLET BY MOUTH ONE TIME A DAY AS DIRECTED FOR ESOPHAGUS    pantoprazole (PROTONIX) 40 MG tablet Take 40 mg by mouth once daily.    predniSONE (DELTASONE) 2.5 MG tablet TAKE TWO TABLETS BY MOUTH ONE TIME A DAY AS DIRECTED FOR INFLAMMATION    sod sulf-pot chloride-mag sulf (SUTAB) 1.479-0.188- 0.225 gram tablet Take 12 tablets by mouth once daily. Take according to package instructions with indicated amount of water. No breakfast day before test. (Patient not taking: Reported on 1/5/2024)    sod sulf-pot chloride-mag sulf (SUTAB) 1.479-0.188- 0.225 gram tablet Take 12 tablets by mouth once daily. Take according to package instructions with indicated amount of water. No breakfast day before test. (Patient not taking: Reported on 1/5/2024)    sod sulf-pot chloride-mag sulf (SUTAB) 1.479-0.188- 0.225 gram tablet Take 12 tablets by mouth once daily. Take according to package instructions with indicated amount of water. No breakfast day before test. May substitute with Suprep, Clenpiq, Plenvu, Moviprep or GoLytely based on Rx plan and patient preference. (Patient not taking: Reported on 1/5/2024)    VYVANSE 60 mg capsule TAKE ONE CAPSULE BY MOUTH ONE TIME A DAY AS DIRECTED for attention deficit     Family History       Problem Relation (Age of Onset)    Colon cancer Father (72)    Colonic polyp Brother    Heart disease Mother (72)    Heart failure Son (13)    No Known Problems Maternal Grandmother, Maternal Grandfather, Paternal Grandmother, Paternal Grandfather          Tobacco Use    Smoking status: Never    Smokeless tobacco: Never   Substance and Sexual Activity    Alcohol use: Never      Comment: rare    Drug use: Never    Sexual activity: Not on file     Review of Systems   Constitutional: Negative for chills, fever and malaise/fatigue.   HENT:  Negative for congestion and nosebleeds.    Eyes:  Negative for blurred vision.   Cardiovascular:  Negative for chest pain, dyspnea on exertion, irregular heartbeat, leg swelling, near-syncope, orthopnea, palpitations, paroxysmal nocturnal dyspnea and syncope.   Respiratory:  Negative for cough, hemoptysis, shortness of breath, sleep disturbances due to breathing, sputum production and wheezing.    Endocrine: Negative for polyphagia.   Hematologic/Lymphatic: Negative for bleeding problem. Does not bruise/bleed easily.   Skin:  Negative for itching and rash.   Musculoskeletal:  Negative for back pain, joint swelling, muscle cramps and muscle weakness.   Gastrointestinal:  Negative for bloating, abdominal pain, hematemesis, hematochezia, nausea and vomiting.   Genitourinary:  Negative for dysuria and hematuria.   Neurological:  Negative for dizziness, focal weakness, headaches, light-headedness, loss of balance, numbness and weakness.   Psychiatric/Behavioral:  Negative for altered mental status.      Objective:     Vital Signs (Most Recent):  Pulse: 70 (02/19/24 0840)  Resp: 14 (02/19/24 0840)  BP: 116/69 (02/19/24 0845)  SpO2: (!) 94 % (02/19/24 0840) Vital Signs (24h Range):  Pulse:  [70] 70  Resp:  [14] 14  SpO2:  [94 %] 94 %  BP: (113-116)/(63-69) 116/69          There is no height or weight on file to calculate BMI.    SpO2: (!) 94 %        Physical Exam  Vitals and nursing note reviewed.   Constitutional:       General: She is not in acute distress.     Appearance: Normal appearance. She is well-developed. She is not diaphoretic.   HENT:      Head: Normocephalic and atraumatic.      Mouth/Throat:      Mouth: Mucous membranes are moist.      Pharynx: No oropharyngeal exudate.   Eyes:      Conjunctiva/sclera: Conjunctivae normal.      Pupils: Pupils are  equal, round, and reactive to light.   Cardiovascular:      Rate and Rhythm: Normal rate and regular rhythm. No extrasystoles are present.     Pulses: Normal pulses and intact distal pulses.           Radial pulses are 2+ on the right side and 2+ on the left side.        Dorsalis pedis pulses are 2+ on the right side and 2+ on the left side.      Heart sounds: Normal heart sounds, S1 normal and S2 normal.      Comments: Left chest wall device site in good condition.      Pulmonary:      Effort: Pulmonary effort is normal. No accessory muscle usage or respiratory distress.      Breath sounds: Normal breath sounds. No decreased breath sounds, wheezing, rhonchi or rales.   Chest:      Chest wall: No tenderness.   Abdominal:      General: Bowel sounds are normal. There is no distension.      Palpations: Abdomen is soft.      Tenderness: There is no abdominal tenderness. There is no guarding.   Musculoskeletal:         General: Normal range of motion.      Cervical back: Normal range of motion and neck supple.   Skin:     General: Skin is warm and dry.      Findings: No erythema or rash.   Neurological:      Mental Status: She is alert and oriented to person, place, and time. She is not disoriented.      Sensory: No sensory deficit.      Motor: No abnormal muscle tone.      Coordination: Coordination normal.      Gait: Gait normal.   Psychiatric:         Mood and Affect: Mood normal.         Behavior: Behavior normal.         Thought Content: Thought content normal.         Judgment: Judgment normal.            Significant Labs:  Pre procedure labs from 2/6/24 reviewed    Significant Imaging:  ECG

## 2024-02-19 NOTE — ANESTHESIA PROCEDURE NOTES
Intubation    Date/Time: 2/19/2024 10:43 AM    Performed by: Blanca Yadav CRNA  Authorized by: Edel Chen MD    Intubation:     Induction:  Intravenous    Intubated:  Postinduction    Mask Ventilation:  Easy mask    Attempts:  1    Attempted By:  CRNA    Method of Intubation:  Video laryngoscopy    Blade:  Branch 3    Laryngeal View Grade: Grade I - full view of cords      Difficult Airway Encountered?: No      Complications:  None    Airway Device:  Oral endotracheal tube    Airway Device Size:  7.0    Style/Cuff Inflation:  Cuffed (MOP)    Secured at:  The lips    Placement Verified By:  Capnometry and Revisualization with laryngoscopy    Complicating Factors:  None    Findings Post-Intubation:  BS equal bilateral and atraumatic/condition of teeth unchanged  Notes:      Head and neck maintained in neutral position throughout.

## 2024-02-19 NOTE — HPI
Ms. Estrada is a 58 year old female with a PMHx of  SLE with anticardiolipin Ab, Hx coronary spasm, on CCB, cath x2 reportedly negative, NICM 40%, hx WCT, s/p DCCV in the field and subsequent ICD, Persistent AF, CVA 2019 without residual, HL, hypothyroid, +FHx fo SCD: brother (CHF) and son (age 13, while playing video game)    History obtained through patient report and clinic notes:     Didn't tolerate multaq for AF.  In setting of recently starting propafenone for AF Rx, developed a WCT at home.   EMS ECG showed WCT with RBBB, R sup axis, transition V4. No Syncope (though nearly so); DCCV'd in the field. [place] better when back to NSR. ICD implanted by Dr Yoo in [place].     AF was DCCV'd 10/2/23 as well. She feels fatigued all the time. SOB. Hard to walk without ASTORGA. GDMT is being titrated by primary cardiologist Dr. Jett ([place]).     cMRI: no scar. 43% LVEF.  echo 2023: 40%  event monitor: SR, 9% PVCs (multifocal)  Stress test 1/2019 neg     Update 1/2024:  Feeling well today. Still gets symptomatic AF.  Her device shows 11% AF despite amiodarone. RVP 8%  ECG is APVS.   Complex case.  WCT most likely VT. Not likely SVT with aberrancy, despite propafenone, as the morphology is not typical for that.  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  Persistent AF     CM  Dr. Jett has begun GDMT; defer titration to them.  PET stress was negative.  Genetic screen for familial CM. (pending)     2. Discussed AF and its basic pathophysiology, including its health implications and treatment options (rate vs. rhythm control, meds vs. procedural/device treatment) as appropriate for the patient.  d/c amio today in prep for PVI  Informed consent was obtained, we discussed the risks and benefits of  the procedure (pulmonary vein isolation) which included (but was not limited to) the possibility of bleeding or injury to the vessels involved, embolism, cardiac perforation, cardiac tamponade requiring emergent drainage with a  pericardial drain or surgery, esophageal injury or fistula formation, phrenic nerve injury, pulmonary vein stenosis, injury to the native conduction system of the heart requiring a PPM, renal injury if contrast used, MI, stroke, and death. We also reviewed indications, and alternatives of the planned procedure. All questions were answered. Patient wishes to proceed.  I discussed with patient risks, indications, benefits, and alternatives of the planned procedure. All questions were answered. Patient understands and wishes to proceed.  Plan RF PVI following amio washout.    Ms. Estrada presents today to [facility] for scheduled PVI with Dr. Fernandes. She denies any chest pain, palpitations, SOB, ASTORGA, dizziness, light headedness, weakness, syncope, or near syncopal episodes. She denies any bleeding, infections, fevers, rashes, or surgeries in the past 30 days. She is currently taking eliquis 5 mg BID (last dose taken yesterday PM), carvedilol 6.25 mg BID, entresto and lasix.    ECG today shows Atrial paced rhythm at 70 bpm  ms QRS 96 ms QT/Qtc 416/449 ms.

## 2024-02-19 NOTE — TRANSFER OF CARE
Anesthesia Transfer of Care Note    Patient: MARY Estrada    Procedure(s) Performed: Procedure(s) (LRB):  Ablation atrial fibrillation (N/A)  ECHOCARDIOGRAM, TRANSESOPHAGEAL (N/A)  Ablation, Atrial Tachycardia  Ablation, Atrial Flutter, Atypical  Cardioversion or Defibrillation    Patient location: PACU    Anesthesia Type: general    Transport from OR: Transported from OR on 6-10 L/min O2 by face mask with adequate spontaneous ventilation    Post pain: adequate analgesia    Post assessment: no apparent anesthetic complications and tolerated procedure well    Post vital signs: stable    Level of consciousness: awake, alert and responds to stimulation    Nausea/Vomiting: no nausea/vomiting    Complications: none    Transfer of care protocol was followedComments: Report given to recovery RN.       Last vitals: Visit Vitals  /69 (BP Location: Right arm, Patient Position: Lying)   Pulse 70   Resp 14   SpO2 (!) 94%   Breastfeeding No

## 2024-02-19 NOTE — CONSULTS
[facility], [place]  MÓNICA Consult      MARY Estrada  YOB: 1965  Medical Record Number:  [id number]  Attending Physician:  Jose Alejandro Fernandes MD   Current Principal Problem:  Atrial fibrillation, persistent    History     Cc: AF    HPI  58 year old female with a PMHx of  SLE with anticardiolipin Ab, Hx coronary spasm, on CCB, cath x2 reportedly negative, NICM 40%, hx WCT, s/p DCCV in the field and subsequent ICD, Persistent AF, CVA 2019 without residual, HL, hypothyroid, +FHx fo SCD: brother (CHF) and son (age 13, while playing video game)   Here for PVI   No issues since last seen     Dysphagia or odynophagia:  yes, prior EGD at OSH with no mechanical obstruction   Liver Disease, esophageal disease, or known varices:  No  Upper GI Bleeding: No  Snoring:  No  Sleep Apnea:  No  Prior neck surgery or radiation:  No  History of anesthetic difficulties:  No  Family history of anesthetic difficulties:  No  Last oral intake: yesterday before midnight  Able to move neck in all directions:  Yes    Medications - Outpatient  Prior to Admission medications    Medication Sig Start Date End Date Taking? Authorizing Provider   albuterol (PROVENTIL) 2.5 mg /3 mL (0.083 %) nebulizer solution Take 2.5 mg by nebulization every 6 (six) hours as needed. Rescue   Yes Provider, Historical   albuterol (PROVENTIL/VENTOLIN HFA) 90 mcg/actuation inhaler use 2 sprays BY MOUTH EVERY FOUR HOURS AS DIRECTED AS NEEDED FOR BREATHING 1/7/23  Yes Provider, Historical   atorvastatin (LIPITOR) 20 MG tablet TAKE ONE TABLET BY MOUTH ONE TIME A DAY AS DIRECTED FOR CHOLESTEROL 4/5/23  Yes Provider, Historical   carvediloL (COREG) 6.25 MG tablet Take 1 tablet (6.25 mg total) by mouth 2 (two) times daily. 1/5/24  Yes Jose Alejandro Fernandes MD   DULoxetine (CYMBALTA) 60 MG capsule TAKE ONE CAPSULE BY MOUTH ONE TIME A DAY AS DIRECTED 3/25/23  Yes Provider, Historical   ELIQUIS 5 mg Tab Take 5 mg by mouth 2 (two) times daily.  10/2/23  Yes Provider, Historical   ENTRESTO 24-26 mg per tablet Take 1 tablet by mouth 2 (two) times daily. 10/5/23  Yes Provider, Historical   furosemide (LASIX) 40 MG tablet TAKE ONE TABLET BY MOUTH TWO TIMES A DAY AS DIRECTED AS NEEDED FOR FLUID 4/5/23  Yes Provider, Historical   gabapentin (NEURONTIN) 100 MG capsule Take 200 mg by mouth every evening.   Yes Provider, Historical   hydrOXYchloroQUINE (PLAQUENIL) 200 mg tablet TAKE ONE TABLET BY MOUTH TWO TIMES A DAY AS DIRECTED FOR ARTHRITIS 4/5/23  Yes Provider, Historical   leflunomide (ARAVA) 20 MG Tab TAKE ONE TABLET BY MOUTH ONE TIME A DAY AS DIRECTED FOR ARTHRITIS 4/5/23  Yes Provider, Historical   levothyroxine (SYNTHROID) 125 MCG tablet TAKE ONE TABLET BY MOUTH ONE TIME A DAY IN THE MORNING on empty stomach AS DIRECTED FOR THYROID SUPPLEMENT 4/5/23  Yes Provider, Historical   montelukast (SINGULAIR) 10 mg tablet Take 10 mg by mouth once daily.   Yes Provider, Historical   montelukast (SINGULAIR) 10 mg tablet TAKE ONE TABLET BY MOUTH ONE TIME A DAY AS DIRECTED FOR ALLERGIES AND ASTHMA 3/25/23  Yes Provider, Historical   MULTIVIT,MIN52-FOLIC-VITK-CQ10 ORAL Take by mouth.   Yes Provider, Historical   omega-3 fatty acids/fish oil (FISH OIL-OMEGA-3 FATTY ACIDS) 300-1,000 mg capsule Take by mouth once daily.   Yes Provider, Historical   potassium chloride SA (K-DUR,KLOR-CON) 20 MEQ tablet TAKE ONE TABLET BY MOUTH TWO TIMES A DAY AS DIRECTED FOR POTASSIUM SUPPLEMENT 4/5/23  Yes Provider, Historical   soy isofl/blk coh/gr tea/yerba (ESTROVEN ENERGY ORAL) Take 1 tablet by mouth Daily.   Yes Provider, Historical   TRELEGY ELLIPTA 200-62.5-25 mcg inhaler INHALE ONE INHALATION BY MOUTH ONE TIME A DAY AS DIRECTED FOR BREATHING 4/5/23  Yes Provider, Historical   amiodarone (PACERONE) 200 MG Tab Take 200 mg by mouth once daily.    Provider, Historical   aspirin (ECOTRIN) 81 MG EC tablet Take 81 mg by mouth 2 (two) times a day.    Provider, Historical   BENLYSTA 200  mg/mL AtIn INJECT ONE PEN INJECTOR SUBCUTANEOUSLY ONCE A WEEK 4/19/23   Provider, Historical   denosumab (PROLIA) 60 mg/mL Syrg Inject 60 mg into the skin every 6 (six) months.    Provider, Historical   DULoxetine (CYMBALTA) 60 MG capsule Take 60 mg by mouth once daily.    Provider, Historical   fluticasone-umeclidin-vilanter (TRELEGY ELLIPTA) 200-62.5-25 mcg inhaler Inhale 1 puff into the lungs once daily.    Provider, Historical   furosemide (LASIX) 40 MG tablet Take 40 mg by mouth once daily.    Provider, Historical   hydrOXYchloroQUINE (PLAQUENIL) 200 mg tablet Take 200 mg by mouth 2 (two) times a day.    Provider, Historical   levothyroxine (SYNTHROID) 125 MCG tablet Take 125 mcg by mouth before breakfast.    Provider, Historical   lisdexamfetamine (VYVANSE) 60 MG capsule Take 60 mg by mouth every morning.    Provider, Historical   loratadine (CLARITIN) 10 mg tablet Take 10 mg by mouth once daily.    Provider, Historical   lovastatin (MEVACOR) 20 MG tablet Take 20 mg by mouth once daily.    Provider, Historical   pantoprazole (PROTONIX) 20 MG tablet TAKE ONE TABLET BY MOUTH ONE TIME A DAY AS DIRECTED FOR ESOPHAGUS 3/25/23   Provider, Historical   pantoprazole (PROTONIX) 40 MG tablet Take 40 mg by mouth once daily.    Provider, Historical   predniSONE (DELTASONE) 2.5 MG tablet TAKE TWO TABLETS BY MOUTH ONE TIME A DAY AS DIRECTED FOR INFLAMMATION 3/25/23   Provider, Historical   sod sulf-pot chloride-mag sulf (SUTAB) 1.479-0.188- 0.225 gram tablet Take 12 tablets by mouth once daily. Take according to package instructions with indicated amount of water. No breakfast day before test.  Patient not taking: Reported on 1/5/2024 3/9/22   Haily Orellana MD   sod sulf-pot chloride-mag sulf (SUTAB) 1.479-0.188- 0.225 gram tablet Take 12 tablets by mouth once daily. Take according to package instructions with indicated amount of water. No breakfast day before test.  Patient not taking: Reported on 1/5/2024 4/18/22    Lowell, Gale, NP   sod sulf-pot chloride-mag sulf (SUTAB) 1.479-0.188- 0.225 gram tablet Take 12 tablets by mouth once daily. Take according to package instructions with indicated amount of water. No breakfast day before test. May substitute with Suprep, Clenpiq, Plenvu, Moviprep or GoLytely based on Rx plan and patient preference.  Patient not taking: Reported on 2024   Gale Ness NP   VYVANSE 60 mg capsule TAKE ONE CAPSULE BY MOUTH ONE TIME A DAY AS DIRECTED for attention deficit 23   Provider, Historical       Medications - Current  Scheduled Meds:  Continuous Infusions:    Allergies  Review of patient's allergies indicates:   Allergen Reactions    Sulfa (sulfonamide antibiotics) Swelling and Rash     Lips swelling     Benzoyl peroxide     Benzoyl peroxide Hives    Botox cosmetic [onabotulinumtoxina (cosmetic)]     Botox [onabotulinumtoxina] Hives    Sulfa (sulfonamide antibiotics)      Past Medical History  Past Medical History:   Diagnosis Date    ADD (attention deficit disorder)     Anxiety disorder, unspecified     BMI 28.0-28.9,adult     Cardiomyopathy     CHF (congestive heart failure)     Chronic constipation     Depression     Essential (primary) hypertension     GERD (gastroesophageal reflux disease)     High cholesterol     Mild intermittent asthma, uncomplicated     Systemic lupus erythematosus, organ or system involvement unspecified     Unspecified urinary incontinence     Vasospasm, coronary:  and  MI but no obstructive CAD     Venous insufficiency of leg      Past Surgical History  Past Surgical History:   Procedure Laterality Date    ABDOMINOPLASTY      AUGMENTATION OF BREAST       SECTION      COLONOSCOPY      use adult colonoscope    HYSTERECTOMY      pelvic floor reconstruction        ROS  10 point ROS performed and negative except as stated in HPI     Physical Examination   Vital Signs  Vitals  Pulse: 70  Heart Rate Source:  "Monitor  Resp: 14  SpO2: (!) 94 %  Pulse Oximetry Type: Intermittent  BP: 116/69  MAP (mmHg): 87  BP Location: Right arm  BP Method: Automatic  Patient Position: Lying    24 Hour VS Range  Pulse:  [70]   Resp:  [14]   BP: (113-116)/(63-69)   SpO2:  [94 %]   No intake or output data in the 24 hours ending 02/19/24 1003      Physical Exam  HENT:      Head: Normocephalic and atraumatic.   Eyes:      Conjunctiva/sclera: Conjunctivae normal.   Cardiovascular:      Rate and Rhythm: Normal rate and regular rhythm.      Heart sounds: Normal heart sounds.   Pulmonary:      Effort: Pulmonary effort is normal. No respiratory distress.      Breath sounds: Normal breath sounds. No wheezing.   Abdominal:      General: There is no distension.      Palpations: Abdomen is soft.      Tenderness: There is no abdominal tenderness.   Musculoskeletal:      Cervical back: Normal range of motion.   Neurological:      Mental Status: She is alert and oriented to person, place, and time.   Psychiatric:         Mood and Affect: Affect normal.         Data   No results for input(s): "WBC", "HGB", "HCT", "PLT" in the last 168 hours.   No results for input(s): "PROTIME", "INR" in the last 168 hours.   No results for input(s): "NA", "K", "CL", "CO2", "BUN", "CREATININE", "ANIONGAP", "CALCIUM" in the last 168 hours.   Assessment & Plan     MÓNICA for GREG assessment prior to ablation/  -No absolute contraindications of esophageal stricture, tumor, perforation, laceration,or diverticulum and/or active GI bleed  -The risks, benefits & alternatives of the procedure were explained to the patient.   -The risks of transesophageal echo include but are not limited to:  Dental trauma, esophageal trauma/perforation, bleeding, laryngospasm/brochospasm, aspiration, sore throat/hoarseness, & dislodgement of the endotracheal tube/nasogastric tube (where applicable).    -The risks of ANES monitored sedation include hypotension, respiratory depression, arrhythmias, " bronchospasm, & death.    -Informed consent was obtained. The patient is agreeable to proceed with the procedure and all questions and concerns addressed.    Case discussed with an attending in echocardiography lab.    Demar , MD  [facility]   Cardiovascular Disease PGY-V

## 2024-02-20 ENCOUNTER — DOCUMENTATION ONLY (OUTPATIENT)
Dept: ELECTROPHYSIOLOGY | Facility: CLINIC | Age: 59
End: 2024-02-20
Payer: COMMERCIAL

## 2024-02-20 ENCOUNTER — DOCUMENTATION ONLY (OUTPATIENT)
Dept: CARDIOLOGY | Facility: HOSPITAL | Age: 59
End: 2024-02-20
Payer: COMMERCIAL

## 2024-02-20 VITALS
SYSTOLIC BLOOD PRESSURE: 103 MMHG | TEMPERATURE: 98 F | RESPIRATION RATE: 18 BRPM | OXYGEN SATURATION: 94 % | DIASTOLIC BLOOD PRESSURE: 57 MMHG | HEART RATE: 80 BPM

## 2024-02-20 LAB
OHS QRS DURATION: 108 MS
OHS QTC CALCULATION: 548 MS

## 2024-02-20 PROCEDURE — 63600175 PHARM REV CODE 636 W HCPCS: Performed by: NURSE PRACTITIONER

## 2024-02-20 PROCEDURE — 25000003 PHARM REV CODE 250: Performed by: NURSE PRACTITIONER

## 2024-02-20 RX ORDER — FUROSEMIDE 40 MG/1
40 TABLET ORAL DAILY
Status: DISCONTINUED | OUTPATIENT
Start: 2024-02-20 | End: 2024-02-20 | Stop reason: HOSPADM

## 2024-02-20 RX ORDER — PANTOPRAZOLE SODIUM 40 MG/1
40 TABLET, DELAYED RELEASE ORAL DAILY
Qty: 30 TABLET | Refills: 0 | Status: SHIPPED | OUTPATIENT
Start: 2024-02-20 | End: 2024-03-21

## 2024-02-20 RX ORDER — CARVEDILOL 6.25 MG/1
12.5 TABLET ORAL 2 TIMES DAILY
Qty: 180 TABLET | Refills: 1 | Status: SHIPPED | OUTPATIENT
Start: 2024-02-20 | End: 2024-05-10

## 2024-02-20 RX ADMIN — AMIODARONE HYDROCHLORIDE 0.5 MG/MIN: 1.8 INJECTION, SOLUTION INTRAVENOUS at 12:02

## 2024-02-20 RX ADMIN — PANTOPRAZOLE SODIUM 40 MG: 40 TABLET, DELAYED RELEASE ORAL at 12:02

## 2024-02-20 RX ADMIN — MONTELUKAST 10 MG: 10 TABLET, FILM COATED ORAL at 09:02

## 2024-02-20 RX ADMIN — HYDROXYCHLOROQUINE SULFATE 200 MG: 200 TABLET, FILM COATED ORAL at 09:02

## 2024-02-20 RX ADMIN — ATORVASTATIN CALCIUM 20 MG: 20 TABLET, FILM COATED ORAL at 09:02

## 2024-02-20 RX ADMIN — LEVOTHYROXINE SODIUM 125 MCG: 25 TABLET ORAL at 05:02

## 2024-02-20 RX ADMIN — CARVEDILOL 6.25 MG: 6.25 TABLET, FILM COATED ORAL at 09:02

## 2024-02-20 RX ADMIN — SACUBITRIL AND VALSARTAN 1 TABLET: 24; 26 TABLET, FILM COATED ORAL at 09:02

## 2024-02-20 RX ADMIN — DULOXETINE HYDROCHLORIDE 60 MG: 60 CAPSULE, DELAYED RELEASE ORAL at 09:02

## 2024-02-20 RX ADMIN — LEFLUNOMIDE 20 MG: 20 TABLET ORAL at 09:02

## 2024-02-20 RX ADMIN — APIXABAN 5 MG: 5 TABLET, FILM COATED ORAL at 09:02

## 2024-02-20 RX ADMIN — FUROSEMIDE 40 MG: 40 TABLET ORAL at 11:02

## 2024-02-20 RX ADMIN — AMIODARONE HYDROCHLORIDE 0.5 MG/MIN: 1.8 INJECTION, SOLUTION INTRAVENOUS at 09:02

## 2024-02-20 RX ADMIN — POTASSIUM CHLORIDE 20 MEQ: 1500 TABLET, EXTENDED RELEASE ORAL at 09:02

## 2024-02-20 RX ADMIN — ATORVASTATIN CALCIUM 20 MG: 20 TABLET, FILM COATED ORAL at 12:02

## 2024-02-20 NOTE — DISCHARGE INSTRUCTIONS
"Ablation Discharge Instructions and Care of Your Groin      Follow up:  Follow up with Dr. Fernandes in 4 months.  Follow up echo in 4 months.    Medications:  Make sure to continue taking your blood thinner eliquis after your procedure as prescribed  Increase your carvedilol 12.5 mg two times per day  Take pantoprazole (trade name: Protonix) nightly for at least 30 days after your procedure. This is to protect your esophagus during the post-operative period.  If given a prescription of furosemide (trade name: Lasix), which is a diuretic (fluid pill), you can take it daily or twice daily as needed for fluid retention or shortness of breath following your ablation  You may experience chest discomfort (also known as "pericarditis") with deep breathes, coughing, and/or laying down which is typically normal following your procedure. If this occurs, you can take ibuprofen (Motrin) 800 mg every 8 hours for 2-3 days. If the chest pain is persistent or severe please visit the nearest emergency department.    Groin site management, precautions, and restrictions:  Remove the bandages over your groin area the morning after your procedure. You can shower after you remove these bandages. Wash the sites at least once daily with soap and water. Keep the groin sites clean and dry. You do not need to apply ointments or bandages to the area.   Wear loose clothes and loose underwear.  Do not take a bath or submerge your groin area (for example: Jacuzzi, swimming pool, or lake) or at least 1 week or when the puncture sites in your groin have completely healed.     If bleeding should occur at the groin sites following discharge:  If oozing from groin site occurs, lay down and apply pressure to the puncture site with your fingers without letting up for 15 minutes and lay flat for 1 hour. If bleeding has resolved, you can continue to monitor. If the bleeding continues or there is significant swelling or pain in the groin area, please call 911 " immediately and visit the nearest ER for evaluation and treatment. Do not drive yourself to the hospital. DO NOT STOP TAKING YOUR BLOOD THINNER UNLESS INSTRUCTED BY A PHYSICIAN.     Activity Instructions:  Do not drive or operate any dangerous machinery for 24 hours, but optimally 48-72 hours since you were given general anesthesia.   Do not strain during bowel movements for the first 3 to 4 days after the procedure to prevent bleeding from the groin sites.  Avoid heavy lifting (more than 10 pounds) and pushing or pulling heavy objects for the first 5 to 7 days after the procedure.  Do not participate in strenuous activities for 5 days after the procedure. This includes most sports - jogging, golfing, play tennis, and bowling.  You may climb stairs if needed, but walk up and down the stairs more slowly than usual.  Gradually increase your activities until you reach your normal activity level within one week after the procedure.    Go to the Emergency Department if you develop:   Change in color or temperature of the leg  Redness, warmth, or drainage/pus at the groin sites  Chills or fever greater than 101.0 F   Severe bleeding or swelling at the groin sites  Acute Weakness or numbness   Visual, gait or speech disturbances   New chest pain, palpitations, shortness of breath, fainting

## 2024-02-20 NOTE — PROGRESS NOTES
Cardiac device interrogation and/or reprogramming completed by industry representative, Musa with [employer]; please refer to report located in the Media tab.

## 2024-02-20 NOTE — NURSING
Nurses Note -- 4 Eyes      2/19/2024   7:00 PM      Skin assessed during: Admit      [x] No Altered Skin Integrity Present    []Prevention Measures Documented      [] Yes- Altered Skin Integrity Present or Discovered   [] LDA Added if Not in Epic (Describe Wound)   [] New Altered Skin Integrity was Present on Admit and Documented in LDA   [] Wound Image Taken    Wound Care Consulted? No    Attending Nurse:  Eunice Newman RN    Second RN/Staff Member:   KYRA Kelly

## 2024-02-20 NOTE — ANESTHESIA POSTPROCEDURE EVALUATION
Anesthesia Post Evaluation    Patient: MARY Estrada    Procedure(s) Performed: Procedure(s) (LRB):  Ablation atrial fibrillation (N/A)  ECHOCARDIOGRAM, TRANSESOPHAGEAL (N/A)  Ablation, Atrial Tachycardia  Ablation, Atrial Flutter, Atypical  Cardioversion or Defibrillation    Final Anesthesia Type: general      Patient location during evaluation: PACU  Patient participation: Yes- Able to Participate  Level of consciousness: awake and alert  Post-procedure vital signs: reviewed and stable  Pain management: adequate  Airway patency: patent    PONV status at discharge: No PONV  Anesthetic complications: no      Cardiovascular status: hemodynamically stable  Respiratory status: unassisted  Hydration status: euvolemic  Follow-up not needed.              Vitals Value Taken Time   /66 02/19/24 1902   Temp 36.7 °C (98.1 °F) 02/19/24 1900   Pulse 70 02/19/24 1914   Resp 18 02/19/24 1908   SpO2 98 % 02/19/24 1911   Vitals shown include unvalidated device data.      No case tracking events are documented in the log.      Pain/Steve Score: Pain Rating Prior to Med Admin: 3 (2/19/2024  4:21 PM)  Steve Score: 9 (2/19/2024  4:45 PM)

## 2024-02-20 NOTE — PROGRESS NOTES
Nursing Transfer Note        Reason patient is being transferred: to assigned room post recovery    Transfer To: 317    Transfer via stretcher    Transfer with cardiac monitoring    Transported by escort    Telemetry: Box Number [id number]    Medicines sent: amiodarone gtt infusing    Any special needs or follow-up needed: sutures due to be cut at 1940    Patient belongings transferred with patient: Yes    Chart send with patient: Yes    Notified: son    Patient reassessed at: to be done by receiving RN (date, time)

## 2024-02-21 DIAGNOSIS — I42.8 NICM (NONISCHEMIC CARDIOMYOPATHY): ICD-10-CM

## 2024-02-21 DIAGNOSIS — I48.19 ATRIAL FIBRILLATION, PERSISTENT: Primary | ICD-10-CM

## 2024-02-21 NOTE — DISCHARGE SUMMARY
[facility] - Cardiology Stepdown  Cardiac Electrophysiology  Discharge Summary      Patient Name: MARY Estrada  MRN: [id number]  Admission Date: 2/19/2024  Hospital Length of Stay: 0 days  Discharge Date and Time: 2/20/2024  6:31 PM  Attending Physician: Jose Alejandro Fernandes MD  Discharging Provider: Lynda Haji NP  Primary Care Physician: Nicki Hernández MD    HPI: Ms. Estrada is a 58 year old female with a PMHx of  SLE with anticardiolipin Ab, Hx coronary spasm, on CCB, cath x2 reportedly negative, NICM 40%, hx WCT, s/p DCCV in the field and subsequent ICD, Persistent AF, CVA 2019 without residual, HL, hypothyroid, +FHx fo SCD: brother (CHF) and son (age 13, while playing video game)     History obtained through patient report and clinic notes:     Didn't tolerate multaq for AF.  In setting of recently starting propafenone for AF Rx, developed a WCT at home.   EMS ECG showed WCT with RBBB, R sup axis, transition V4. No Syncope (though nearly so); DCCV'd in the field. [place] better when back to NSR. ICD implanted by Dr Yoo in [place].     AF was DCCV'd 10/2/23 as well. She feels fatigued all the time. SOB. Hard to walk without ASTORGA. GDMT is being titrated by primary cardiologist Dr. Jett ([place]).     cMRI: no scar. 43% LVEF.  echo 2023: 40%  event monitor: SR, 9% PVCs (multifocal)  Stress test 1/2019 neg     Update 1/2024:  Feeling well today. Still gets symptomatic AF.  Her device shows 11% AF despite amiodarone. RVP 8%  ECG is APVS.   Complex case.  WCT most likely VT. Not likely SVT with aberrancy, despite propafenone, as the morphology is not typical for that.  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  Persistent AF     CM  Dr. Jett has begun GDMT; defer titration to them.  PET stress was negative.  Genetic screen for familial CM. (pending)     2. Discussed AF and its basic pathophysiology, including its health implications and treatment options (rate vs. rhythm control, meds vs. procedural/device  treatment) as appropriate for the patient.  d/c amio today in prep for PVI. All questions were answered. Patient understands and wishes to proceed.  Plan RF PVI following amio washout.     Ms. Estrada presents today to [facility] for scheduled PVI with Dr. Fernandes. She denies any chest pain, palpitations, SOB, dizziness, light headedness, weakness, syncope, or near syncopal episodes. She does note ASTORGA and LE swelling. She denies any bleeding, infections, fevers, rashes, or surgeries in the past 30 days. She is currently taking eliquis 5 mg BID (last dose taken yesterday PM), carvedilol 6.25 mg BID, entresto and lasix 40 mg daily.     ECG today shows Atrial paced rhythm at 70 bpm  ms QRS 96 ms QT/Qtc 416/449 ms.    Procedure(s) (LRB):  Ablation atrial fibrillation (N/A)  ECHOCARDIOGRAM, TRANSESOPHAGEAL (N/A)  Ablation, Atrial Tachycardia  Ablation, Atrial Flutter, Atypical  Cardioversion or Defibrillation     Indwelling Lines/Drains at time of discharge:  None     Hospital Course: Patient underwent successful PVI RFA, posterior box lesion set, AT ablation, and ablation of multiple focal left atrial tachycardias--per Dr. Fernandes, challenging/difficult, given challenging transseptal puncture as well as multiple arrhythmias. Admitted to PACU, post-procedure ECG showed atrial paced rhythm with PACs at 73 bpm. Bilateral groin sites with sutures removed, dressings C/D/I. No bleeding, hematoma, or bruit noted. Due to the numerous atrial arrhythmias during the case, we started her on IV amiodarone 150 mg loading, then 1 mg/min x 6 hours, then 0.5 mg/min x 18 hours (see procedure note for details), tolerated procedure well with no acute complications. Bedrest completed x 6 hours. Patient was monitored overnight on telemetry, no acute arrhythmias. Patient ambulating, tolerating PO intake, and voiding with no issues. Denies any chest pain or SOB. Device interrogated this AM by [employer] (see device report). Reviewed  "with Dr. Fernandes and discharge plans discussed. Patient to start Amiodarone 200 mg once daily. Start pantoprazole 40 mg daily x 30 days. Increase carvedilol to 12.5 mg two times per day--to help reduce PACs. Continue all home medications including Eliquis for CVA prophylaxis. Follow up with Dr. Fernandes in 4 months. Follow up echo in 4 months. Ms. Estrada was assessed at bedside prior to discharge. She reported feeling well and denied chest discomfort, shortness of breath, palpitations, lightheadedness, or any other acute symptoms. We discussed symptoms she has been having of intermittent muscular "weakness" on the right side which has been occurring for greater than one month. She believes she needs to see neurology for which I agree. I recommended for her to return to her neurologist she has seen in the past. Discharge plans/instructions discussed with patient and  who verbalized understanding and agreement of plans of care. No further questions or concerns voiced at this time. She was discharged home in stable condition.     Physical Exam:   Gen: No acute distress, pleasant patient answering questions appropriately  CVS: Regular rate and rhythm, S1S2 normal, no murmurs/rubs/gallops  CHEST: Clear to auscultation bilaterally, no wheezes/rales/ronchi. Left chest wall device site in good condition.   ABD: Soft, non-tender, nondistended, bowel sounds present  GROINS:Bilateral groin sites soft, non tender, no bleeding, no swelling, no hematoma   Neurologic: Normal strength and tone. No focal numbness or weakness.   Psychiatric: Normal mood and affect. Behavior is normal. Alert and oriented X 3.  EXT: No Edema    Goals of Care Treatment Preferences:    Consults: MÓNICA    Significant Diagnostic Studies: N/A    Final Active Diagnoses:    Diagnosis Date Noted POA    PRINCIPAL PROBLEM:  Atrial fibrillation, persistent [I48.19] 10/11/2023 Yes      Problems Resolved During this Admission:     Discharged Condition: " "stable    Disposition: Home or Self Care    Follow Up:   Follow-up Information       ECHO, [facility] Follow up in 4 month(s).    Specialty: Cardiology  Why: Post ablation             Jose Alejandro Fernandes MD Follow up in 4 month(s).    Specialties: Electrophysiology, Cardiology  Why: Post ablation  Contact information:  [address]  [facility] [address]  [phone]                           Patient Instructions:      No driving until:     Other restrictions (specify):   Order Comments: Ablation Discharge Instructions and Care of Your Groin      Follow up:  · Follow up with Dr. Fernandes in 4 months.  · Follow up echo in 4 months.    Medications:  · Make sure to continue taking your blood thinner Eliquis after your procedure as prescribed.  · Start Amiodarone 200 mg once daily.  · Increase your carvedilol to 12.5 mg two times per day  · Take pantoprazole (trade name: Protonix) nightly for at least 30 days after your procedure. This is to protect your esophagus during the post-operative period.  · If given a prescription of furosemide (trade name: Lasix), which is a diuretic (fluid pill), you can take it daily or twice daily as needed for fluid retention or shortness of breath following your ablation  · You may experience chest discomfort (also known as "pericarditis") with deep breathes, coughing, and/or laying down which is typically normal following your procedure. If this occurs, you can take ibuprofen (Motrin) 800 mg every 8 hours for 2-3 days. If the chest pain is persistent or severe please visit the nearest emergency department.    Groin site management, precautions, and restrictions:  · Remove the bandages over your groin area the morning after your procedure. You can shower after you remove these bandages. Wash the sites at least once daily with soap and water. Keep the groin sites clean and dry. You do not need to apply ointments or bandages to the area.   · Wear loose clothes and loose underwear.  · Do " not take a bath or submerge your groin area (for example: Jacuzzi, swimming pool, or lake) or at least 1 week or when the puncture sites in your groin have completely healed.     If bleeding should occur at the groin sites following discharge:  · If oozing from groin site occurs, lay down and apply pressure to the puncture site with your fingers without letting up for 15 minutes and lay flat for 1 hour. If bleeding has resolved, you can continue to monitor. If the bleeding continues or there is significant swelling or pain in the groin area, please call 911 immediately and visit the nearest ER for evaluation and treatment. Do not drive yourself to the hospital. DO NOT STOP TAKING YOUR BLOOD THINNER UNLESS INSTRUCTED BY A PHYSICIAN.     Activity Instructions:  · Do not drive or operate any dangerous machinery for 24 hours, but optimally 48-72 hours since you were given general anesthesia.   · Do not strain during bowel movements for the first 3 to 4 days after the procedure to prevent bleeding from the groin sites.  · Avoid heavy lifting (more than 10 pounds) and pushing or pulling heavy objects for the first 5 to 7 days after the procedure.  · Do not participate in strenuous activities for 5 days after the procedure. This includes most sports - jogging, golfing, play tennis, and bowling.  · You may climb stairs if needed, but walk up and down the stairs more slowly than usual.  · Gradually increase your activities until you reach your normal activity level within one week after the procedure.    Go to the Emergency Department if you develop:   · Change in color or temperature of the leg  · Redness, warmth, or drainage/pus at the groin sites  · Chills or fever greater than 101.0 F   · Severe bleeding or swelling at the groin sites  · Acute Weakness or numbness   · Visual, gait or speech disturbances   · New chest pain, palpitations, shortness of breath, fainting     Lifting restrictions     Notify your health care  provider if you experience any of the following:  increased confusion or weakness     Notify your health care provider if you experience any of the following:  persistent dizziness, light-headedness, or visual disturbances     Notify your health care provider if you experience any of the following:  worsening rash     Notify your health care provider if you experience any of the following:  severe persistent headache     Notify your health care provider if you experience any of the following:  difficulty breathing or increased cough     Notify your health care provider if you experience any of the following:  redness, tenderness, or signs of infection (pain, swelling, redness, odor or green/yellow discharge around incision site)     Notify your health care provider if you experience any of the following:  severe uncontrolled pain     Notify your health care provider if you experience any of the following:  persistent nausea and vomiting or diarrhea     Notify your health care provider if you experience any of the following:  temperature >100.4     No dressing needed     Shower on day dressing removed (No bath)     Weight bearing restrictions (specify):     Medications:  Reconciled Home Medications:      Medication List        CHANGE how you take these medications      carvediloL 6.25 MG tablet  Commonly known as: COREG  Take 2 tablets (12.5 mg total) by mouth 2 (two) times daily.  What changed: how much to take     pantoprazole 40 MG tablet  Commonly known as: PROTONIX  Take 1 tablet (40 mg total) by mouth once daily.  What changed: Another medication with the same name was removed. Continue taking this medication, and follow the directions you see here.            CONTINUE taking these medications      * albuterol 90 mcg/actuation inhaler  Commonly known as: PROVENTIL/VENTOLIN HFA  use 2 sprays BY MOUTH EVERY FOUR HOURS AS DIRECTED AS NEEDED FOR BREATHING     * albuterol 2.5 mg /3 mL (0.083 %) nebulizer  solution  Commonly known as: PROVENTIL  Take 2.5 mg by nebulization every 6 (six) hours as needed. Rescue     amiodarone 200 MG Tab  Commonly known as: PACERONE  Take 200 mg by mouth once daily.     aspirin 81 MG EC tablet  Commonly known as: ECOTRIN  Take 81 mg by mouth 2 (two) times a day.     atorvastatin 20 MG tablet  Commonly known as: LIPITOR  TAKE ONE TABLET BY MOUTH ONE TIME A DAY AS DIRECTED FOR CHOLESTEROL     BENLYSTA 200 mg/mL Atin  Generic drug: belimumab  INJECT ONE PEN INJECTOR SUBCUTANEOUSLY ONCE A WEEK     denosumab 60 mg/mL Syrg  Commonly known as: PROLIA  Inject 60 mg into the skin every 6 (six) months.     * DULoxetine 60 MG capsule  Commonly known as: CYMBALTA  TAKE ONE CAPSULE BY MOUTH ONE TIME A DAY AS DIRECTED     * DULoxetine 60 MG capsule  Commonly known as: CYMBALTA  Take 60 mg by mouth once daily.     ELIQUIS 5 mg Tab  Generic drug: apixaban  Take 5 mg by mouth 2 (two) times daily.     ENTRESTO 24-26 mg per tablet  Generic drug: sacubitriL-valsartan  Take 1 tablet by mouth 2 (two) times daily.     ESTROVEN ENERGY ORAL  Take 1 tablet by mouth Daily.     fish oil-omega-3 fatty acids 300-1,000 mg capsule  Take by mouth once daily.     * TRELEGY ELLIPTA 200-62.5-25 mcg inhaler  Generic drug: fluticasone-umeclidin-vilanter  INHALE ONE INHALATION BY MOUTH ONE TIME A DAY AS DIRECTED FOR BREATHING     * fluticasone-umeclidin-vilanter 200-62.5-25 mcg inhaler  Commonly known as: TRELEGY ELLIPTA  Inhale 1 puff into the lungs once daily.     * furosemide 40 MG tablet  Commonly known as: LASIX  TAKE ONE TABLET BY MOUTH TWO TIMES A DAY AS DIRECTED AS NEEDED FOR FLUID     * furosemide 40 MG tablet  Commonly known as: LASIX  Take 40 mg by mouth once daily.     gabapentin 100 MG capsule  Commonly known as: NEURONTIN  Take 200 mg by mouth every evening.     * hydroxychloroquine 200 mg tablet  Commonly known as: PLAQUENIL  TAKE ONE TABLET BY MOUTH TWO TIMES A DAY AS DIRECTED FOR ARTHRITIS     *  hydroxychloroquine 200 mg tablet  Commonly known as: PLAQUENIL  Take 200 mg by mouth 2 (two) times a day.     leflunomide 20 MG Tab  Commonly known as: ARAVA  TAKE ONE TABLET BY MOUTH ONE TIME A DAY AS DIRECTED FOR ARTHRITIS     * levothyroxine 125 MCG tablet  Commonly known as: SYNTHROID  TAKE ONE TABLET BY MOUTH ONE TIME A DAY IN THE MORNING on empty stomach AS DIRECTED FOR THYROID SUPPLEMENT     * levothyroxine 125 MCG tablet  Commonly known as: SYNTHROID  Take 125 mcg by mouth before breakfast.     * VYVANSE 60 MG capsule  Generic drug: lisdexamfetamine  TAKE ONE CAPSULE BY MOUTH ONE TIME A DAY AS DIRECTED for attention deficit     * lisdexamfetamine 60 MG capsule  Commonly known as: VYVANSE  Take 60 mg by mouth every morning.     loratadine 10 mg tablet  Commonly known as: CLARITIN  Take 10 mg by mouth once daily.     lovastatin 20 MG tablet  Commonly known as: MEVACOR  Take 20 mg by mouth once daily.     * montelukast 10 mg tablet  Commonly known as: SINGULAIR  TAKE ONE TABLET BY MOUTH ONE TIME A DAY AS DIRECTED FOR ALLERGIES AND ASTHMA     * montelukast 10 mg tablet  Commonly known as: SINGULAIR  Take 10 mg by mouth once daily.     MULTIVIT,MIN52-FOLIC-VITK-CQ10 ORAL  Take by mouth.     potassium chloride SA 20 MEQ tablet  Commonly known as: K-DUR,KLOR-CON  TAKE ONE TABLET BY MOUTH TWO TIMES A DAY AS DIRECTED FOR POTASSIUM SUPPLEMENT     predniSONE 2.5 MG tablet  Commonly known as: DELTASONE  TAKE TWO TABLETS BY MOUTH ONE TIME A DAY AS DIRECTED FOR INFLAMMATION           * This list has 16 medication(s) that are the same as other medications prescribed for you. Read the directions carefully, and ask your doctor or other care provider to review them with you.                ASK your doctor about these medications      * SUTAB 1.479-0.188- 0.225 gram tablet  Generic drug: sod sulf-pot chloride-mag sulf  Take 12 tablets by mouth once daily. Take according to package instructions with indicated amount of water.  No breakfast day before test.     * SUTAB 1.479-0.188- 0.225 gram tablet  Generic drug: sod sulf-pot chloride-mag sulf  Take 12 tablets by mouth once daily. Take according to package instructions with indicated amount of water. No breakfast day before test.     * SUTAB 1.479-0.188- 0.225 gram tablet  Generic drug: sod sulf-pot chloride-mag sulf  Take 12 tablets by mouth once daily. Take according to package instructions with indicated amount of water. No breakfast day before test. May substitute with Suprep, Clenpiq, Plenvu, Moviprep or GoLytely based on Rx plan and patient preference.           * This list has 3 medication(s) that are the same as other medications prescribed for you. Read the directions carefully, and ask your doctor or other care provider to review them with you.                Plan:  -Start Amiodarone 200 mg once daily.   -Start pantoprazole 40 mg daily x 30 days.   -Increase carvedilol to 12.5 mg two times per day--to help reduce PACs.   -Continue all home medications including Eliquis for CVA prophylaxis.   -Follow up with Dr. Fernandes in 4 months.   -Follow up echo in 4 months.     Time spent on the discharge of patient: 25 minutes    Lynda Haji NP  Cardiac Electrophysiology  [facility] -Arrhythmia    Attending: Jose Alejandro Fernandes MD

## 2024-04-26 ENCOUNTER — TELEPHONE (OUTPATIENT)
Dept: ELECTROPHYSIOLOGY | Facility: CLINIC | Age: 59
End: 2024-04-26
Payer: COMMERCIAL

## 2024-04-26 ENCOUNTER — PATIENT MESSAGE (OUTPATIENT)
Dept: ELECTROPHYSIOLOGY | Facility: CLINIC | Age: 59
End: 2024-04-26
Payer: COMMERCIAL

## 2024-05-09 ENCOUNTER — CLINICAL SUPPORT (OUTPATIENT)
Dept: CARDIOLOGY | Facility: HOSPITAL | Age: 59
End: 2024-05-09
Attending: INTERNAL MEDICINE
Payer: COMMERCIAL

## 2024-05-09 ENCOUNTER — CLINICAL SUPPORT (OUTPATIENT)
Dept: CARDIOLOGY | Facility: HOSPITAL | Age: 59
End: 2024-05-09
Payer: COMMERCIAL

## 2024-05-09 DIAGNOSIS — I44.1 ATRIOVENTRICULAR BLOCK, SECOND DEGREE: ICD-10-CM

## 2024-05-09 DIAGNOSIS — I42.0 DILATED CARDIOMYOPATHY: ICD-10-CM

## 2024-05-09 DIAGNOSIS — I47.29 OTHER VENTRICULAR TACHYCARDIA: ICD-10-CM

## 2024-05-09 DIAGNOSIS — Z95.810 PRESENCE OF AUTOMATIC (IMPLANTABLE) CARDIAC DEFIBRILLATOR: ICD-10-CM

## 2024-05-09 DIAGNOSIS — I47.20 VENTRICULAR TACHYCARDIA, UNSPECIFIED: ICD-10-CM

## 2024-05-09 DIAGNOSIS — Z95.0 PRESENCE OF CARDIAC PACEMAKER: ICD-10-CM

## 2024-05-09 DIAGNOSIS — I50.32 CHRONIC DIASTOLIC (CONGESTIVE) HEART FAILURE: ICD-10-CM

## 2024-05-09 PROCEDURE — 93296 REM INTERROG EVL PM/IDS: CPT | Performed by: INTERNAL MEDICINE

## 2024-05-09 PROCEDURE — 93295 DEV INTERROG REMOTE 1/2/MLT: CPT | Mod: ,,, | Performed by: INTERNAL MEDICINE

## 2024-05-10 DIAGNOSIS — I48.19 ATRIAL FIBRILLATION, PERSISTENT: Primary | ICD-10-CM

## 2024-05-10 RX ORDER — CARVEDILOL 12.5 MG/1
12.5 TABLET ORAL 2 TIMES DAILY
Qty: 180 TABLET | Refills: 3 | Status: SHIPPED | OUTPATIENT
Start: 2024-05-10

## 2024-05-17 LAB
OHS CV AF BURDEN PERCENT: < 1
OHS CV DC REMOTE DEVICE TYPE: NORMAL
OHS CV RV PACING PERCENT: 1 %

## 2024-06-18 ENCOUNTER — TELEPHONE (OUTPATIENT)
Dept: ELECTROPHYSIOLOGY | Facility: CLINIC | Age: 59
End: 2024-06-18
Payer: COMMERCIAL

## 2024-06-18 NOTE — TELEPHONE ENCOUNTER
----- Message from Kei Arguello MA sent at 6/17/2024  3:56 PM CDT -----  Regarding: FW: ECHO    ----- Message -----  From: Saige Cardona  Sent: 6/17/2024   8:51 AM CDT  To: [facility] Staff  Subject: ECHO                                             Pt is scheduled for an echo on 6/21/24 and is scheduled to come back to see Kelly Razo on 6/28 and she lives 5 1/2 hours away and her  is calling to see if she can have the echo done closer to her home in [place], [place].  Her  Elver Clemons can be reached at [phone].    Thank you

## 2024-06-19 ENCOUNTER — TELEPHONE (OUTPATIENT)
Dept: ELECTROPHYSIOLOGY | Facility: CLINIC | Age: 59
End: 2024-06-19
Payer: COMMERCIAL

## 2024-06-19 NOTE — TELEPHONE ENCOUNTER
----- Message from Kei Arguello MA sent at 6/18/2024  4:50 PM CDT -----  Contact: Magda pineda [phone]    ----- Message -----  From: Yamile Monte MA  Sent: 6/18/2024   4:49 PM CDT  To: [facility] Staff    Pt is returning your call. Please call.

## 2024-06-21 ENCOUNTER — HOSPITAL ENCOUNTER (OUTPATIENT)
Dept: CARDIOLOGY | Facility: HOSPITAL | Age: 59
Discharge: HOME | End: 2024-06-21
Attending: INTERNAL MEDICINE
Payer: COMMERCIAL

## 2024-06-21 VITALS — BODY MASS INDEX: 29.32 KG/M2 | HEIGHT: 65 IN | WEIGHT: 176 LBS

## 2024-06-21 DIAGNOSIS — I48.19 ATRIAL FIBRILLATION, PERSISTENT: ICD-10-CM

## 2024-06-21 DIAGNOSIS — I42.8 NICM (NONISCHEMIC CARDIOMYOPATHY): ICD-10-CM

## 2024-06-21 LAB
ASCENDING AORTA: 3.52 CM
AV INDEX (PROSTH): 0.66
AV MEAN GRADIENT: 5 MMHG
AV PEAK GRADIENT: 8 MMHG
AV VALVE AREA BY VELOCITY RATIO: 2.92 CM²
AV VALVE AREA: 2.24 CM²
AV VELOCITY RATIO: 0.86
BSA FOR ECHO PROCEDURE: 1.91 M2
CV ECHO LV RWT: 0.35 CM
DOP CALC AO PEAK VEL: 1.42 M/S
DOP CALC AO VTI: 36.88 CM
DOP CALC LVOT AREA: 3.4 CM2
DOP CALC LVOT DIAMETER: 2.08 CM
DOP CALC LVOT PEAK VEL: 1.22 M/S
DOP CALC LVOT STROKE VOLUME: 82.49 CM3
DOP CALCLVOT PEAK VEL VTI: 24.29 CM
E WAVE DECELERATION TIME: 330.13 MSEC
E/A RATIO: 1.24
E/E' RATIO: 16.15 M/S
ECHO LV POSTERIOR WALL: 0.89 CM (ref 0.6–1.1)
EJECTION FRACTION: 50 %
FRACTIONAL SHORTENING: 12 % (ref 28–44)
INTERVENTRICULAR SEPTUM: 0.97 CM (ref 0.6–1.1)
LA MAJOR: 5.83 CM
LA MINOR: 5.81 CM
LA WIDTH: 4.51 CM
LEFT ATRIUM SIZE: 4.41 CM
LEFT ATRIUM VOLUME INDEX MOD: 44.9 ML/M2
LEFT ATRIUM VOLUME INDEX: 52.6 ML/M2
LEFT ATRIUM VOLUME MOD: 83.87 CM3
LEFT ATRIUM VOLUME: 98.39 CM3
LEFT INTERNAL DIMENSION IN SYSTOLE: 4.51 CM (ref 2.1–4)
LEFT VENTRICLE DIASTOLIC VOLUME INDEX: 70.44 ML/M2
LEFT VENTRICLE DIASTOLIC VOLUME: 131.72 ML
LEFT VENTRICLE MASS INDEX: 91 G/M2
LEFT VENTRICLE SYSTOLIC VOLUME INDEX: 49.7 ML/M2
LEFT VENTRICLE SYSTOLIC VOLUME: 92.85 ML
LEFT VENTRICULAR INTERNAL DIMENSION IN DIASTOLE: 5.1 CM (ref 3.5–6)
LEFT VENTRICULAR MASS: 170.75 G
LV LATERAL E/E' RATIO: 13.13 M/S
LV SEPTAL E/E' RATIO: 21 M/S
MV PEAK A VEL: 0.85 M/S
MV PEAK E VEL: 1.05 M/S
MV STENOSIS PRESSURE HALF TIME: 95.74 MS
MV VALVE AREA P 1/2 METHOD: 2.3 CM2
RA MAJOR: 4.32 CM
RA PRESSURE ESTIMATED: 3 MMHG
RA WIDTH: 3.04 CM
SINUS: 3.17 CM
STJ: 2.97 CM
TDI LATERAL: 0.08 M/S
TDI SEPTAL: 0.05 M/S
TDI: 0.07 M/S
TRICUSPID ANNULAR PLANE SYSTOLIC EXCURSION: 1.1 CM
Z-SCORE OF LEFT VENTRICULAR DIMENSION IN END DIASTOLE: -0.22
Z-SCORE OF LEFT VENTRICULAR DIMENSION IN END SYSTOLE: 2.68

## 2024-06-21 PROCEDURE — 93306 TTE W/DOPPLER COMPLETE: CPT

## 2024-06-21 PROCEDURE — 93306 TTE W/DOPPLER COMPLETE: CPT | Mod: 26,,, | Performed by: INTERNAL MEDICINE

## 2024-06-26 PROBLEM — Z79.01 ON ANTICOAGULANT THERAPY: Status: ACTIVE | Noted: 2024-06-26

## 2024-06-26 PROBLEM — Z98.890 HISTORY OF RADIOFREQUENCY ABLATION (RFA) PROCEDURE FOR CARDIAC ARRHYTHMIA: Status: ACTIVE | Noted: 2024-06-26

## 2024-06-26 PROBLEM — I47.19 ATRIAL TACHYCARDIA: Status: ACTIVE | Noted: 2024-06-26

## 2024-06-26 NOTE — PROGRESS NOTES
Mauriciojayna is a patient of Dr. Fernandes and was last seen in clinic 1/5/2024.      Subjective:   Patient ID:  MARY Estrada is a 59 y.o. female who presents for follow up of Atrial Fibrillation and ICD  .     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 female with SLE, coronary spasm, NICM, WCT s/p ICD, persistent AF, CVA (2019), HLD, hypothyroid, PVCs here for follow up after ablation.    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  In setting of recently starting propafenone for AF Rx, developed a WCT at home.   EMS ECG showed WCT with RBBB, R sup axis, transition V4. No Syncope (though nearly so); DCCV'd in the field. [place] better when back to NSR. ICD implanted by Dr Yoo in [place].    AF was DCCV'd 10/2/23 as well. She feels fatigued all the time. SOB. Hard to walk without ASTORGA. GDMT is being titrated by primary cardiologist Dr. Jett ([place]).    cMRI: no scar. 43% LVEF.  echo 2023: 40%  event monitor: SR, 9% PVCs (multifocal)  Stress test 1/2019 neg    Update 1/2024:  Feeling well today. Still gets symptomatic AF.  Her device shows 11% AF despite amiodarone. RVP 8%  ECG is APVS.   Complex case.  WCT most likely VT. Not likely SVT with aberrancy, despite propafenone, as the morphology is not typical for that.  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  Persistent AF    CM  Dr. Jett has begun GDMT; defer titration to them.  PET stress was negative.  Genetic screen for familial CM. (pending)    2. Discussed AF and its basic pathophysiology, including its health implications and treatment options (rate vs. rhythm control, meds vs. procedural/device treatment) as appropriate for the patient.  d/c  amio today in prep for PVI  Informed consent was obtained, we discussed the risks and benefits of  the procedure (pulmonary vein isolation) which included (but was not limited to) the possibility of bleeding or injury to the vessels involved, embolism, cardiac perforation, cardiac tamponade requiring emergent drainage with a pericardial drain or surgery, esophageal injury or fistula formation, phrenic nerve injury, pulmonary vein stenosis, injury to the native conduction system of the heart requiring a PPM, renal injury if contrast used, MI, stroke, and death. We also reviewed indications, and alternatives of the planned procedure. All questions were answered. Patient wishes to proceed.  I discussed with patient risks, indications, benefits, and alternatives of the planned procedure. All questions were answered. Patient understands and wishes to proceed.  Plan RF PVI following amio washout.    3. ICD  Follow here.    Update (06/28/2024):    2/19/2024:    Successful pulmonary vein RF ablation (challenging/difficult, given challenging transseptal puncture as well as multiple arrhythmias as detailed).    Additional linear lesions in the LA (a posterior box lesion set, with 2 posterior lines).    Successful ablation of atrial tachycardia.    Catheter ablation of multiple focal left atrial tachycardias.    Cardioversion was successfully performed with restoration of normal sinus rhythm.    MÓNICA EF 40%  Discharged on amiodarone, which pt stopped 3/2024 due to muscle weakness.    Today she says she feels much better since ablation and especially after stopping amiodarone which had given her significant side effects (muscle weakness, urinary incontinence). Palpitations improved - no sustained episodes. Continues to have episodes of LH. No exertional CP, worsening ASTORGA reported.    She is currently taking eliquis 5mg BID for stroke prophylaxis and denies significant bleeding episodes. She is currently being treated with  carvedilol 12.5mg BID for HR control. Kidney function is stable, with a creatinine of 1.1 on 2/6/2024.  No changes made- temporarily changed base rate to 70 bpm and noted an increase in the number of PVC and PAC's seen.    Device Interrogation (6/28/2024) reveals an intrinsic SB with occ PAC and PVC with stable lead and device function. ATx2 since ablation, longest 36 seconds.   She paces 95% in the RA and 1% in the RV. Estimated battery longevity 11 years.     I have personally reviewed the patient's EKG today, which shows sinus rhythm with PVC at 80bpm. ND interval is 208. QRS is 98. QTc is 447.    Relevant Cardiac Test Results:    2D Echo (6/21/2024):    Left Ventricle: The left ventricle is at the upper limits of normal measuring 5.1 cm. Normal wall thickness. There is mild eccentric hypertrophy. There is mildly reduced systolic function. Ejection fraction by visual approximation is 50%. Grade II diastolic dysfunction.    Right Ventricle: Normal right ventricular cavity size. Wall thickness is normal. Systolic function is normal. Pacemaker lead present in the ventricle.    Left Atrium: Left atrium is severely dilated.    Mitral Valve: There is mild regurgitation.    Pulmonary Artery: Pulmonary artery pressure could not be estimated.    IVC/SVC: Normal venous pressure at 3 mmHg.    Current Outpatient Medications   Medication Sig    albuterol (PROVENTIL) 2.5 mg /3 mL (0.083 %) nebulizer solution Take 2.5 mg by nebulization every 6 (six) hours as needed. Rescue    albuterol (PROVENTIL/VENTOLIN HFA) 90 mcg/actuation inhaler use 2 sprays BY MOUTH EVERY FOUR HOURS AS DIRECTED AS NEEDED FOR BREATHING    amiodarone (PACERONE) 200 MG Tab Take 200 mg by mouth once daily.    aspirin (ECOTRIN) 81 MG EC tablet Take 81 mg by mouth 2 (two) times a day.    atorvastatin (LIPITOR) 20 MG tablet TAKE ONE TABLET BY MOUTH ONE TIME A DAY AS DIRECTED FOR CHOLESTEROL    BENLYSTA 200 mg/mL AtIn INJECT ONE PEN INJECTOR SUBCUTANEOUSLY ONCE  A WEEK    carvediloL (COREG) 12.5 MG tablet Take 1 tablet (12.5 mg total) by mouth 2 (two) times daily.    denosumab (PROLIA) 60 mg/mL Syrg Inject 60 mg into the skin every 6 (six) months.    DULoxetine (CYMBALTA) 60 MG capsule Take 60 mg by mouth once daily.    DULoxetine (CYMBALTA) 60 MG capsule TAKE ONE CAPSULE BY MOUTH ONE TIME A DAY AS DIRECTED    ELIQUIS 5 mg Tab Take 5 mg by mouth 2 (two) times daily.    ENTRESTO 24-26 mg per tablet Take 1 tablet by mouth 2 (two) times daily.    fluticasone-umeclidin-vilanter (TRELEGY ELLIPTA) 200-62.5-25 mcg inhaler Inhale 1 puff into the lungs once daily.    furosemide (LASIX) 40 MG tablet Take 40 mg by mouth once daily.    furosemide (LASIX) 40 MG tablet TAKE ONE TABLET BY MOUTH TWO TIMES A DAY AS DIRECTED AS NEEDED FOR FLUID    gabapentin (NEURONTIN) 100 MG capsule Take 200 mg by mouth every evening.    hydrOXYchloroQUINE (PLAQUENIL) 200 mg tablet Take 200 mg by mouth 2 (two) times a day.    hydrOXYchloroQUINE (PLAQUENIL) 200 mg tablet TAKE ONE TABLET BY MOUTH TWO TIMES A DAY AS DIRECTED FOR ARTHRITIS    leflunomide (ARAVA) 20 MG Tab TAKE ONE TABLET BY MOUTH ONE TIME A DAY AS DIRECTED FOR ARTHRITIS    levothyroxine (SYNTHROID) 125 MCG tablet Take 125 mcg by mouth before breakfast.    levothyroxine (SYNTHROID) 125 MCG tablet TAKE ONE TABLET BY MOUTH ONE TIME A DAY IN THE MORNING on empty stomach AS DIRECTED FOR THYROID SUPPLEMENT    lisdexamfetamine (VYVANSE) 60 MG capsule Take 60 mg by mouth every morning.    loratadine (CLARITIN) 10 mg tablet Take 10 mg by mouth once daily.    lovastatin (MEVACOR) 20 MG tablet Take 20 mg by mouth once daily.    montelukast (SINGULAIR) 10 mg tablet Take 10 mg by mouth once daily.    montelukast (SINGULAIR) 10 mg tablet TAKE ONE TABLET BY MOUTH ONE TIME A DAY AS DIRECTED FOR ALLERGIES AND ASTHMA    MULTIVIT,MIN52-FOLIC-VITK-CQ10 ORAL Take by mouth.    omega-3 fatty acids/fish oil (FISH OIL-OMEGA-3 FATTY ACIDS) 300-1,000 mg capsule Take by  mouth once daily.    pantoprazole (PROTONIX) 40 MG tablet Take 1 tablet (40 mg total) by mouth once daily.    potassium chloride SA (K-DUR,KLOR-CON) 20 MEQ tablet TAKE ONE TABLET BY MOUTH TWO TIMES A DAY AS DIRECTED FOR POTASSIUM SUPPLEMENT    predniSONE (DELTASONE) 2.5 MG tablet TAKE TWO TABLETS BY MOUTH ONE TIME A DAY AS DIRECTED FOR INFLAMMATION    sod sulf-pot chloride-mag sulf (SUTAB) 1.479-0.188- 0.225 gram tablet Take 12 tablets by mouth once daily. Take according to package instructions with indicated amount of water. No breakfast day before test. (Patient not taking: Reported on 1/5/2024)    sod sulf-pot chloride-mag sulf (SUTAB) 1.479-0.188- 0.225 gram tablet Take 12 tablets by mouth once daily. Take according to package instructions with indicated amount of water. No breakfast day before test. (Patient not taking: Reported on 1/5/2024)    sod sulf-pot chloride-mag sulf (SUTAB) 1.479-0.188- 0.225 gram tablet Take 12 tablets by mouth once daily. Take according to package instructions with indicated amount of water. No breakfast day before test. May substitute with Suprep, Clenpiq, Plenvu, Moviprep or GoLytely based on Rx plan and patient preference. (Patient not taking: Reported on 1/5/2024)    soy isofl/blk coh/gr tea/yerba (ESTROVEN ENERGY ORAL) Take 1 tablet by mouth Daily.    TRELEGY ELLIPTA 200-62.5-25 mcg inhaler INHALE ONE INHALATION BY MOUTH ONE TIME A DAY AS DIRECTED FOR BREATHING    VYVANSE 60 mg capsule TAKE ONE CAPSULE BY MOUTH ONE TIME A DAY AS DIRECTED for attention deficit     No current facility-administered medications for this visit.       Review of Systems   Constitutional: Positive for malaise/fatigue.   Cardiovascular:  Positive for irregular heartbeat. Negative for chest pain, dyspnea on exertion, leg swelling and palpitations.   Respiratory:  Negative for shortness of breath.    Hematologic/Lymphatic: Negative for bleeding problem.   Skin:  Negative for rash.   Musculoskeletal:   "Negative for myalgias.   Gastrointestinal:  Negative for hematemesis, hematochezia and nausea.   Genitourinary:  Negative for hematuria.   Neurological:  Positive for light-headedness.   Psychiatric/Behavioral:  Negative for altered mental status.    Allergic/Immunologic: Negative for persistent infections.       Objective:          /70   Pulse 80   Ht 5' 5" (1.651 m)   Wt 83.5 kg (184 lb 1.4 oz)   BMI 30.63 kg/m²     Physical Exam  Vitals and nursing note reviewed.   Constitutional:       Appearance: Normal appearance. She is well-developed.   HENT:      Head: Normocephalic.      Nose: Nose normal.   Eyes:      Pupils: Pupils are equal, round, and reactive to light.   Cardiovascular:      Rate and Rhythm: Normal rate and regular rhythm. Occasional Extrasystoles are present.  Pulmonary:      Effort: No respiratory distress.      Breath sounds: Normal breath sounds.   Chest:      Comments: ICD in situ  Musculoskeletal:         General: Normal range of motion.   Skin:     General: Skin is warm and dry.      Findings: No erythema.   Neurological:      Mental Status: She is alert and oriented to person, place, and time.   Psychiatric:         Speech: Speech normal.         Behavior: Behavior normal.           Lab Results   Component Value Date     02/06/2024    K 3.8 02/06/2024    BUN 8 02/06/2024    CREATININE 1.1 02/06/2024    HGB 12.6 02/06/2024    HCT 39.3 02/06/2024    TSH 5.313 (H) 10/11/2023       Recent Labs   Lab 02/06/24  1348   INR 1.1       Assessment:     1. Atrial fibrillation, persistent    2. NICM (nonischemic cardiomyopathy)    3. On anticoagulant therapy    4. Atrial tachycardia    5. History of radiofrequency ablation (RFA) procedure for cardiac arrhythmia    6. ICD (implantable cardioverter-defibrillator) in place      Plan:     In summary, Dawn Estrada is a 59 y.o. female with SLE, coronary spasm, NICM (40%), WCT s/p ICD, persistent AF, CVA (2019), HLD, hypothyroid, PVCs here for " follow up after ablation.  She is 4.5 mo s/p ablation (PVI, LA linear lesions, AT RFA). Complex case due to multiple arrhythmias. Pt was discharged on amiodarone but stopped this in March due to muscle weakness. She reports symptom improvement.   No sustained AT/AF since ablation. Remains on eliquis.  Device and leads wnl. Base rate left @80bpm due to increased ectopy when rate reduced. Pt says she feels fine at this rate. No RV pacing. Recent echo indicates EF has improved from 40% to 50% in SR. We discussed restarting vyvanse. Will defer to Dr. Fernandes.     Continue current medication regimen and device settings.   Follow up in device clinic as scheduled.   Follow up in EP clinic (virtual if needed with remote report prior) in 6 mo, sooner as needed.     *A copy of this note has been sent to Dr. Fernandes*    Follow up in about 6 months (around 12/28/2024).    ------------------------------------------------------------------    LINO Parker, NP-C  Cardiac Electrophysiology

## 2024-06-28 ENCOUNTER — OFFICE VISIT (OUTPATIENT)
Dept: ELECTROPHYSIOLOGY | Facility: CLINIC | Age: 59
End: 2024-06-28
Payer: COMMERCIAL

## 2024-06-28 ENCOUNTER — TELEPHONE (OUTPATIENT)
Dept: ELECTROPHYSIOLOGY | Facility: CLINIC | Age: 59
End: 2024-06-28

## 2024-06-28 ENCOUNTER — HOSPITAL ENCOUNTER (OUTPATIENT)
Dept: CARDIOLOGY | Facility: CLINIC | Age: 59
Discharge: HOME | End: 2024-06-28
Payer: COMMERCIAL

## 2024-06-28 ENCOUNTER — CLINICAL SUPPORT (OUTPATIENT)
Dept: CARDIOLOGY | Facility: HOSPITAL | Age: 59
End: 2024-06-28
Attending: INTERNAL MEDICINE
Payer: COMMERCIAL

## 2024-06-28 VITALS
HEIGHT: 65 IN | HEART RATE: 80 BPM | DIASTOLIC BLOOD PRESSURE: 70 MMHG | WEIGHT: 184.06 LBS | SYSTOLIC BLOOD PRESSURE: 112 MMHG | BODY MASS INDEX: 30.67 KG/M2

## 2024-06-28 DIAGNOSIS — Z95.810 ICD (IMPLANTABLE CARDIOVERTER-DEFIBRILLATOR) IN PLACE: ICD-10-CM

## 2024-06-28 DIAGNOSIS — I47.20 V-TACH: ICD-10-CM

## 2024-06-28 DIAGNOSIS — Z98.890 HISTORY OF RADIOFREQUENCY ABLATION (RFA) PROCEDURE FOR CARDIAC ARRHYTHMIA: ICD-10-CM

## 2024-06-28 DIAGNOSIS — I48.19 ATRIAL FIBRILLATION, PERSISTENT: Primary | ICD-10-CM

## 2024-06-28 DIAGNOSIS — I47.19 ATRIAL TACHYCARDIA: ICD-10-CM

## 2024-06-28 DIAGNOSIS — I42.8 NICM (NONISCHEMIC CARDIOMYOPATHY): ICD-10-CM

## 2024-06-28 DIAGNOSIS — Z79.01 ON ANTICOAGULANT THERAPY: ICD-10-CM

## 2024-06-28 LAB
OHS QRS DURATION: 98 MS
OHS QTC CALCULATION: 447 MS

## 2024-06-28 PROCEDURE — 99999 PR PBB SHADOW E&M-EST. PATIENT-LVL V: CPT | Mod: PBBFAC,,, | Performed by: NURSE PRACTITIONER

## 2024-06-28 PROCEDURE — 93005 ELECTROCARDIOGRAM TRACING: CPT | Mod: S$GLB,,, | Performed by: INTERNAL MEDICINE

## 2024-06-28 PROCEDURE — 93010 ELECTROCARDIOGRAM REPORT: CPT | Mod: S$GLB,,, | Performed by: INTERNAL MEDICINE

## 2024-06-28 RX ORDER — VILOXAZINE HYDROCHLORIDE 200 MG/1
2 CAPSULE, EXTENDED RELEASE ORAL
COMMUNITY
Start: 2024-05-08

## 2024-06-28 RX ORDER — PREDNISONE 20 MG/1
TABLET ORAL
COMMUNITY
Start: 2024-01-20

## 2024-06-28 RX ORDER — PREDNISONE 5 MG/1
5 TABLET ORAL
COMMUNITY
Start: 2024-04-05

## 2024-06-28 RX ORDER — IPRATROPIUM BROMIDE AND ALBUTEROL SULFATE 2.5; .5 MG/3ML; MG/3ML
SOLUTION RESPIRATORY (INHALATION)
COMMUNITY
Start: 2024-02-11

## 2024-06-28 RX ORDER — GABAPENTIN 300 MG/1
300 CAPSULE ORAL
COMMUNITY
Start: 2024-05-31

## 2024-06-28 NOTE — TELEPHONE ENCOUNTER
Called pt, no answer left message with Dr Fernandes's note. Asked her to call our office back if she needs to discuss further

## 2024-06-28 NOTE — TELEPHONE ENCOUNTER
----- Message from Jose Alejandro Fernandes MD sent at 6/28/2024 12:57 PM CDT -----  if she does use vyvanse, it should be at the lowest dose possible  ----- Message -----  From: Kelly Razo NP  Sent: 6/28/2024  12:06 PM CDT  To: Jose Alejandro Fernandes MD    Pt with AF/AT, hx of coronary vasospasm, WCT s/p ICD implanted in [place] ~ 4 mo s/p ablation of multiple arrhythmias. Off amio since March with no sustained recurrence. She is asking about restarting vyvanse - I told her I would defer to you. Thx

## 2024-06-28 NOTE — Clinical Note
Pt with AF/AT, hx of coronary vasospasm, WCT s/p ICD implanted in [place] ~ 4 mo s/p ablation of multiple arrhythmias. Off amio since March with no sustained recurrence. She is asking about restarting vyvanse - I told her I would defer to you. Thx

## 2024-06-28 NOTE — TELEPHONE ENCOUNTER
----- Message from Jose Alejandro Fernandes MD sent at 6/28/2024 12:57 PM CDT -----  no absolute contraindication, but any stimulant may increase the chance of recurrent arrhythmias.  DPM  ----- Message -----  From: Kelly Razo NP  Sent: 6/28/2024  12:06 PM CDT  To: Jose Alejandro Fernandes MD    Pt with AF/AT, hx of coronary vasospasm, WCT s/p ICD implanted in [place] ~ 4 mo s/p ablation of multiple arrhythmias. Off amio since March with no sustained recurrence. She is asking about restarting vyvanse - I told her I would defer to you. Thx

## 2024-06-28 NOTE — TELEPHONE ENCOUNTER
Spoke with pt, informed her of Dr Fernandes's other message in regards to vyvance and to take the lowest dose possible if she takes it, pt verbalized understanding

## 2024-07-01 ENCOUNTER — PATIENT MESSAGE (OUTPATIENT)
Dept: ELECTROPHYSIOLOGY | Facility: CLINIC | Age: 59
End: 2024-07-01
Payer: COMMERCIAL

## 2024-08-08 ENCOUNTER — CLINICAL SUPPORT (OUTPATIENT)
Dept: CARDIOLOGY | Facility: HOSPITAL | Age: 59
End: 2024-08-08
Attending: INTERNAL MEDICINE
Payer: COMMERCIAL

## 2024-08-08 ENCOUNTER — CLINICAL SUPPORT (OUTPATIENT)
Dept: CARDIOLOGY | Facility: HOSPITAL | Age: 59
End: 2024-08-08
Payer: COMMERCIAL

## 2024-08-08 DIAGNOSIS — I49.01 VENTRICULAR FIBRILLATION: ICD-10-CM

## 2024-08-08 DIAGNOSIS — Z95.810 PRESENCE OF AUTOMATIC (IMPLANTABLE) CARDIAC DEFIBRILLATOR: ICD-10-CM

## 2024-08-08 PROCEDURE — 93296 REM INTERROG EVL PM/IDS: CPT | Performed by: INTERNAL MEDICINE

## 2024-08-08 PROCEDURE — 93295 DEV INTERROG REMOTE 1/2/MLT: CPT | Mod: ,,, | Performed by: INTERNAL MEDICINE

## 2024-08-11 LAB
OHS CV AF BURDEN PERCENT: < 1
OHS CV DC REMOTE DEVICE TYPE: NORMAL
OHS CV RV PACING PERCENT: 3 %

## 2024-10-23 ENCOUNTER — PATIENT MESSAGE (OUTPATIENT)
Dept: GASTROENTEROLOGY | Facility: CLINIC | Age: 59
End: 2024-10-23
Payer: COMMERCIAL

## 2024-11-07 ENCOUNTER — CLINICAL SUPPORT (OUTPATIENT)
Dept: CARDIOLOGY | Facility: HOSPITAL | Age: 59
End: 2024-11-07
Payer: COMMERCIAL

## 2024-11-07 ENCOUNTER — CLINICAL SUPPORT (OUTPATIENT)
Dept: CARDIOLOGY | Facility: HOSPITAL | Age: 59
End: 2024-11-07
Attending: INTERNAL MEDICINE
Payer: COMMERCIAL

## 2024-11-07 DIAGNOSIS — Z95.810 PRESENCE OF AUTOMATIC (IMPLANTABLE) CARDIAC DEFIBRILLATOR: ICD-10-CM

## 2024-11-07 DIAGNOSIS — I49.01 VENTRICULAR FIBRILLATION: ICD-10-CM

## 2024-11-07 LAB
OHS CV AF BURDEN PERCENT: < 1
OHS CV DC REMOTE DEVICE TYPE: NORMAL
OHS CV RV PACING PERCENT: 5 %

## 2024-11-07 PROCEDURE — 93295 DEV INTERROG REMOTE 1/2/MLT: CPT | Mod: ,,, | Performed by: INTERNAL MEDICINE

## 2024-11-07 PROCEDURE — 93296 REM INTERROG EVL PM/IDS: CPT | Performed by: INTERNAL MEDICINE

## 2025-01-16 ENCOUNTER — PATIENT MESSAGE (OUTPATIENT)
Dept: ELECTROPHYSIOLOGY | Facility: CLINIC | Age: 60
End: 2025-01-16
Payer: COMMERCIAL

## 2025-01-27 DIAGNOSIS — I48.19 ATRIAL FIBRILLATION, PERSISTENT: Primary | ICD-10-CM

## 2025-02-06 ENCOUNTER — CLINICAL SUPPORT (OUTPATIENT)
Dept: CARDIOLOGY | Facility: HOSPITAL | Age: 60
End: 2025-02-06
Attending: INTERNAL MEDICINE
Payer: COMMERCIAL

## 2025-02-06 ENCOUNTER — CLINICAL SUPPORT (OUTPATIENT)
Dept: CARDIOLOGY | Facility: HOSPITAL | Age: 60
End: 2025-02-06
Payer: COMMERCIAL

## 2025-02-06 DIAGNOSIS — Z95.810 PRESENCE OF AUTOMATIC (IMPLANTABLE) CARDIAC DEFIBRILLATOR: ICD-10-CM

## 2025-02-06 DIAGNOSIS — I49.01 VENTRICULAR FIBRILLATION: ICD-10-CM

## 2025-02-06 PROCEDURE — 93296 REM INTERROG EVL PM/IDS: CPT | Performed by: INTERNAL MEDICINE

## 2025-02-06 PROCEDURE — 93295 DEV INTERROG REMOTE 1/2/MLT: CPT | Mod: ,,, | Performed by: INTERNAL MEDICINE

## 2025-02-07 ENCOUNTER — TELEPHONE (OUTPATIENT)
Dept: GASTROENTEROLOGY | Facility: CLINIC | Age: 60
End: 2025-02-07
Payer: COMMERCIAL

## 2025-02-07 NOTE — TELEPHONE ENCOUNTER
----- Message from Fatimah sent at 2/7/2025  9:59 AM CST -----  Contact: pt  Pt calling for appt for acid reflex and can be reached at [phone].    Thanks,

## 2025-02-07 NOTE — TELEPHONE ENCOUNTER
Pt c/o acid reflux is getting really bad to the point ot hoarseness.. pt wanted to make appt to come in to get checked...pt asked for a Monday or Friday appt..  staff spoke to [facility] MCMILLAN to schedule appt..  pt will see [facility] 2-14-25 at 9:20am... SIRI

## 2025-02-10 LAB
OHS CV AF BURDEN PERCENT: < 1
OHS CV DC REMOTE DEVICE TYPE: NORMAL
OHS CV ICD SHOCK: NO
OHS CV RV PACING PERCENT: 5 %

## 2025-02-11 ENCOUNTER — TELEPHONE (OUTPATIENT)
Dept: GASTROENTEROLOGY | Facility: CLINIC | Age: 60
End: 2025-02-11
Payer: COMMERCIAL

## 2025-02-12 ENCOUNTER — TELEPHONE (OUTPATIENT)
Dept: GASTROENTEROLOGY | Facility: CLINIC | Age: 60
End: 2025-02-12
Payer: COMMERCIAL

## 2025-02-14 ENCOUNTER — TELEPHONE (OUTPATIENT)
Dept: GASTROENTEROLOGY | Facility: CLINIC | Age: 60
End: 2025-02-14

## 2025-02-14 ENCOUNTER — OFFICE VISIT (OUTPATIENT)
Dept: GASTROENTEROLOGY | Facility: CLINIC | Age: 60
End: 2025-02-14
Payer: COMMERCIAL

## 2025-02-14 VITALS
HEART RATE: 81 BPM | DIASTOLIC BLOOD PRESSURE: 78 MMHG | OXYGEN SATURATION: 97 % | WEIGHT: 143 LBS | HEIGHT: 65 IN | BODY MASS INDEX: 23.82 KG/M2 | SYSTOLIC BLOOD PRESSURE: 121 MMHG

## 2025-02-14 DIAGNOSIS — Z80.0 FAMILY HISTORY OF COLON CANCER: ICD-10-CM

## 2025-02-14 DIAGNOSIS — K92.1 HEMATOCHEZIA: ICD-10-CM

## 2025-02-14 DIAGNOSIS — R05.9 COUGH, UNSPECIFIED TYPE: ICD-10-CM

## 2025-02-14 DIAGNOSIS — Z86.0100 HISTORY OF COLON POLYPS: ICD-10-CM

## 2025-02-14 DIAGNOSIS — R13.10 DYSPHAGIA, UNSPECIFIED TYPE: ICD-10-CM

## 2025-02-14 DIAGNOSIS — Z79.01 LONG TERM (CURRENT) USE OF ANTICOAGULANTS: ICD-10-CM

## 2025-02-14 DIAGNOSIS — K22.70 BARRETT'S ESOPHAGUS WITHOUT DYSPLASIA: ICD-10-CM

## 2025-02-14 DIAGNOSIS — K21.9 GASTROESOPHAGEAL REFLUX DISEASE WITHOUT ESOPHAGITIS: Primary | ICD-10-CM

## 2025-02-14 DIAGNOSIS — R49.0 HOARSENESS: ICD-10-CM

## 2025-02-14 DIAGNOSIS — K44.9 HIATAL HERNIA: ICD-10-CM

## 2025-02-14 RX ORDER — ONDANSETRON 8 MG/1
8 TABLET, ORALLY DISINTEGRATING ORAL
COMMUNITY
Start: 2024-08-26

## 2025-02-14 RX ORDER — LISDEXAMFETAMINE DIMESYLATE 20 MG/1
1 CAPSULE ORAL DAILY
COMMUNITY
Start: 2025-01-10

## 2025-02-14 RX ORDER — AA/PROT/LYSINE/METHIO/VIT C/B6 50-12.5 MG
TABLET ORAL
COMMUNITY

## 2025-02-14 RX ORDER — TIRZEPATIDE 5 MG/.5ML
5 INJECTION, SOLUTION SUBCUTANEOUS WEEKLY
COMMUNITY
Start: 2024-10-13

## 2025-02-14 NOTE — TELEPHONE ENCOUNTER
Patients UGIS order was faxed to [facility] imaging center in [facility]. Original order is in my hanging thing with fax number on sticky if needed. Fax number is [phone]. Patient was given AVS with apt details. 2/14/25 LRA

## 2025-02-14 NOTE — PATIENT INSTRUCTIONS
Schedule upper GI series.    If any tests, procedures, or imaging has been ordered and you are not contacted to schedule within 1-2 weeks, then you may call the central scheduling department directly at [phone].   Please notify my office if you have not been contacted within two weeks after any procedures, submitting any samples (biopsies, blood, stool, urine, etc.) or after any imaging (X-ray, CT, MRI, etc.).

## 2025-02-14 NOTE — Clinical Note
Patient with worsening reflux. Main symptom is cough. She is concerned about HH seen on CT in 2023. Will order UGIS. I discussed with her BRAVO w/o PPI? 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 Not sure if we should go ahead and order colon too because of blood? She was OK with either.

## 2025-02-14 NOTE — PROGRESS NOTES
Clinic Note    Reason for visit:  The primary encounter diagnosis was Gastroesophageal reflux disease without esophagitis. Diagnoses of Dysphagia, unspecified type, Hiatal hernia, Hematochezia, Hoarseness, Cough, unspecified type, Gore's esophagus without dysplasia, History of colon polyps, Family history of colon cancer, and Long term (current) use of anticoagulants were also pertinent to this visit.    PCP: Nicki Hernández       HPI:  This is a 60 y.o. female here for follow up. She has h/o of Barretts, GERD. She has h/o of reflux but has been worsening recently. She has been having worsening indigestion, hoarseness, cough- seen by pulmonologist, Dr. Lau for asthma, and was put on pantoprazole 40 mg BID. Does note that pantoprazole did improve symptoms but still with frequent breakthrough symptoms. She had a cardiac ablation last year and her cardiac symptoms improved. No SOB but persistent cough. She was started on Mounjaro last year and had a 40 pound weight loss. Her reflux did not improve with weight loss. Her reflux initially worsened when she started Mounjaro. Was taking Pepcid Complete often as needed. Now taking pepcid as needed intermittently. She does belch often. Nausea has improved with panto BID. She does choke often and has dysphagia with liquids and solids. She knows nuts are a trigger for her indigestion. Has been having burning tongue, chapping to lips. Dry mouth. Denies h/o of sjrogen, vitamin deficiency. Her BM are now regular. Has h/o of constipation that improved with MiraLax titration. Has seen blood in stool intermittently. Had one episode of large amount of pink tinged blood. Her father had h/o colon cancer.    She has labs done with [facility]. None available for review currently  Followed by Dr. Urbina due to lupus. On benlysta. States her Lupus has been under control since being on Benlysta. Takes prednisone rarely.    Was seen by ENT-Dr. Brewster in 2016. Negative workup. Had allergy  testing which was negative    Followed by elvi Currie at [facility]. She has an ICD. Follow up is 3/7/2025. On eliquis. S/p cardiac ablation 2/2024. Per notes last ECHO with EF 50%    CTA 2/6/2024 cardiac defib, SALEEM calcified granuloma, mild atelectasis    EGD/Colonoscopy 9/1/2023 DBx nl, GBx mini chr inact gitis w/focal mild react w/o Hp, EGBx ND BE, 3 TA, 1 hyp, pancolonic diverticulosis, IH repeat EGD and colonoscopy w/adult colonoscope in 3 years.     9/11/2023 CT chest w/o: smHH, mild gastric wall thickening     Review of Systems   Constitutional:  Positive for fatigue. Negative for fever and unexpected weight change.   HENT:  Positive for mouth sores, sore throat and trouble swallowing. Negative for postnasal drip.    Eyes:  Negative for pain, discharge and eye dryness.   Respiratory:  Positive for cough, choking and shortness of breath. Negative for apnea, chest tightness and wheezing.    Cardiovascular:  Positive for chest pain. Negative for palpitations and leg swelling.   Gastrointestinal:  Positive for anal bleeding, nausea and reflux. Negative for abdominal distention, abdominal pain, blood in stool, change in bowel habit, constipation, diarrhea, rectal pain, vomiting and fecal incontinence.   Genitourinary:  Negative for bladder incontinence, dysuria and hematuria.   Musculoskeletal:  Positive for arthralgias and joint swelling. Negative for back pain.   Integumentary:  Negative for color change and rash.   Allergic/Immunologic: Positive for environmental allergies and food allergies.   Neurological:  Negative for seizures and headaches.   Hematological:  Positive for adenopathy. Bruises/bleeds easily.        Past Medical History:   Diagnosis Date    A-fib     ADD (attention deficit disorder)     Anxiety disorder, unspecified     BMI 28.0-28.9,adult     Cardiomyopathy     CHF (congestive heart failure)     Depression     Essential (primary) hypertension     GERD (gastroesophageal reflux disease)      High cholesterol     ICD (implantable cardioverter-defibrillator) in place     Mild intermittent asthma, uncomplicated     Systemic lupus erythematosus, organ or system involvement unspecified     Unspecified urinary incontinence     Vasospasm, coronary:  and  MI but no obstructive CAD     Venous insufficiency of leg      Past Surgical History:   Procedure Laterality Date    ABDOMINOPLASTY  2017    ABLATION OF ARRHYTHMOGENIC FOCUS FOR ATRIAL FIBRILLATION N/A 2024    Procedure: Ablation atrial fibrillation;  Surgeon: Jose Alejandro Fernandes MD;  Location: [facility] EP LAB;  Service: Cardiology;  Laterality: N/A;  AF, MÓNICA, PVI, RFA, Carto, Gen, DM, 3 Prep *dcICD BSCI*    ABLATION, ATRIAL FLUTTER, ATYPICAL  2024    Procedure: Ablation, Atrial Flutter, Atypical;  Surgeon: Jose Alejandro Fernandes MD;  Location: [facility] EP LAB;  Service: Cardiology;;    ABLATION, ATRIAL TACHYCARDIA  2024    Procedure: Ablation, Atrial Tachycardia;  Surgeon: Jose Alejandro Fernandes MD;  Location: [facility] EP LAB;  Service: Cardiology;;    AUGMENTATION OF BREAST       SECTION      COLONOSCOPY      use adult colonoscope    HYSTERECTOMY      pelvic floor reconstruction       TRANSESOPHAGEAL ECHOCARDIOGRAPHY N/A 2024    Procedure: ECHOCARDIOGRAM, TRANSESOPHAGEAL;  Surgeon: Klarissa Garcia MD;  Location: [facility] EP LAB;  Service: Cardiology;  Laterality: N/A;    TREATMENT OF CARDIAC ARRHYTHMIA  2024    Procedure: Cardioversion or Defibrillation;  Surgeon: Jose Alejandro Fernandes MD;  Location: [facility] EP LAB;  Service: Cardiology;;     Family History   Problem Relation Name Age of Onset    Heart disease Mother  72    Colon cancer Father  72    Colonic polyp Brother      No Known Problems Maternal Grandmother      No Known Problems Maternal Grandfather      No Known Problems Paternal Grandmother      No Known Problems Paternal Grandfather      Heart failure Son  13    Celiac disease Neg Hx      Liver cancer Neg Hx      Liver  disease Neg Hx      Stomach cancer Neg Hx      Crohn's disease Neg Hx       Social History     Tobacco Use    Smoking status: Never    Smokeless tobacco: Never   Substance Use Topics    Alcohol use: Never     Comment: rare    Drug use: Never     Review of patient's allergies indicates:   Allergen Reactions    Sulfa (sulfonamide antibiotics) Swelling and Rash     Lips swelling     Benzoyl peroxide     Benzoyl peroxide Hives    Botox cosmetic [onabotulinumtoxina (cosmetic)]     Botox [onabotulinumtoxina] Hives    Sulfa (sulfonamide antibiotics)       Medication List with Changes/Refills   Current Medications    ALBUTEROL (PROVENTIL) 2.5 MG /3 ML (0.083 %) NEBULIZER SOLUTION    Take 2.5 mg by nebulization every 6 (six) hours as needed. Rescue    ATORVASTATIN (LIPITOR) 20 MG TABLET    TAKE ONE TABLET BY MOUTH ONE TIME A DAY AS DIRECTED FOR CHOLESTEROL    BACILLUS COAGULANS ORAL    1 once a day    BENLYSTA 200 MG/ML ATIN    INJECT ONE PEN INJECTOR SUBCUTANEOUSLY ONCE A WEEK    CARVEDILOL (COREG) 12.5 MG TABLET    Take 1 tablet (12.5 mg total) by mouth 2 (two) times daily.    COENZYME Q10 10 MG CAPSULE    1 once a day    DENOSUMAB (PROLIA) 60 MG/ML SYRG    Inject 60 mg into the skin every 6 (six) months.    DULOXETINE (CYMBALTA) 60 MG CAPSULE    TAKE ONE CAPSULE BY MOUTH ONE TIME A DAY AS DIRECTED    ELIQUIS 5 MG TAB    Take 5 mg by mouth 2 (two) times daily.    ENTRESTO 24-26 MG PER TABLET    Take 1 tablet by mouth 2 (two) times daily.    FUROSEMIDE (LASIX) 40 MG TABLET    Pt takes 40 in the am and 20 in the pm    GABAPENTIN (NEURONTIN) 100 MG CAPSULE    Take 200 mg by mouth every evening.    GABAPENTIN (NEURONTIN) 300 MG CAPSULE    Take 300 mg by mouth.    HYDROXYCHLOROQUINE (PLAQUENIL) 200 MG TABLET    TAKE ONE TABLET BY MOUTH TWO TIMES A DAY AS DIRECTED FOR ARTHRITIS    LEFLUNOMIDE (ARAVA) 20 MG TAB    TAKE ONE TABLET BY MOUTH ONE TIME A DAY AS DIRECTED FOR ARTHRITIS    LEVOTHYROXINE (SYNTHROID) 125 MCG TABLET    TAKE  "ONE TABLET BY MOUTH ONE TIME A DAY IN THE MORNING on empty stomach AS DIRECTED FOR THYROID SUPPLEMENT    LISDEXAMFETAMINE (VYVANSE) 20 MG CAPSULE    Take 1 capsule by mouth once daily.    MONTELUKAST (SINGULAIR) 10 MG TABLET    TAKE ONE TABLET BY MOUTH ONE TIME A DAY AS DIRECTED FOR ALLERGIES AND ASTHMA    MOUNJARO 5 MG/0.5 ML PNIJ    Inject 5 mg into the skin once a week.    MULTIVIT,MIN52-FOLIC-VITK-CQ10 ORAL    Take by mouth.    OMEGA-3 FATTY ACIDS/FISH OIL (FISH OIL-OMEGA-3 FATTY ACIDS) 300-1,000 MG CAPSULE    Take by mouth once daily.    ONDANSETRON (ZOFRAN-ODT) 8 MG TBDL    8 mg as needed.    PANTOPRAZOLE (PROTONIX) 40 MG TABLET    Take 1 tablet (40 mg total) by mouth once daily.    POTASSIUM CHLORIDE SA (K-DUR,KLOR-CON) 20 MEQ TABLET    TAKE ONE TABLET BY MOUTH TWO TIMES A DAY AS DIRECTED FOR POTASSIUM SUPPLEMENT    PREDNISONE (DELTASONE) 20 MG TABLET        PREDNISONE (DELTASONE) 5 MG TABLET    Take 5 mg by mouth every 48 hours as needed.    SOY ISOFL/BLK COH/GR TEA/YERBA (ESTROVEN ENERGY ORAL)    Take 1 tablet by mouth Daily.    TRELEGY ELLIPTA 200-62.5-25 MCG INHALER    INHALE ONE INHALATION BY MOUTH ONE TIME A DAY AS DIRECTED FOR BREATHING    ZINC OXIDE, BULK, MISC    1 once a day   Discontinued Medications    ALBUTEROL (PROVENTIL/VENTOLIN HFA) 90 MCG/ACTUATION INHALER    use 2 sprays BY MOUTH EVERY FOUR HOURS AS DIRECTED AS NEEDED FOR BREATHING    ALBUTEROL-IPRATROPIUM (DUO-NEB) 2.5 MG-0.5 MG/3 ML NEBULIZER SOLUTION    SMARTSI Ampule(s) Via Nebulizer Every 6 Hours PRN    LOVASTATIN (MEVACOR) 20 MG TABLET    Take 20 mg by mouth once daily.    QELBREE 200 MG CP24    Take 2 capsules by mouth.         Vital Signs:  /78   Pulse 81   Ht 5' 5" (1.651 m)   Wt 64.9 kg (143 lb)   SpO2 97%   BMI 23.80 kg/m²        Physical Exam  Vitals reviewed.   Constitutional:       General: She is awake. She is not in acute distress.     Appearance: Normal appearance. She is well-developed. She is not " ill-appearing, toxic-appearing or diaphoretic.   HENT:      Head: Normocephalic and atraumatic.      Nose: Nose normal.      Mouth/Throat:      Mouth: Mucous membranes are moist.      Pharynx: Oropharynx is clear. No oropharyngeal exudate or posterior oropharyngeal erythema.   Eyes:      General: Lids are normal. Gaze aligned appropriately. No scleral icterus.        Right eye: No discharge.         Left eye: No discharge.      Conjunctiva/sclera: Conjunctivae normal.   Neck:      Trachea: Trachea normal.   Cardiovascular:      Rate and Rhythm: Normal rate and regular rhythm.      Pulses:           Radial pulses are 2+ on the right side and 2+ on the left side.   Pulmonary:      Effort: Pulmonary effort is normal. No respiratory distress.      Breath sounds: No stridor. No wheezing.   Chest:      Chest wall: No tenderness.   Abdominal:      General: Bowel sounds are normal. There is no distension.      Palpations: Abdomen is soft. There is no fluid wave, hepatomegaly or mass.      Tenderness: There is no abdominal tenderness. There is no guarding or rebound.   Musculoskeletal:         General: No tenderness or deformity.      Cervical back: No tenderness.      Right lower leg: No edema.      Left lower leg: No edema.   Lymphadenopathy:      Cervical: No cervical adenopathy.   Skin:     General: Skin is warm and dry.      Capillary Refill: Capillary refill takes less than 2 seconds.      Coloration: Skin is not cyanotic, jaundiced or pale.   Neurological:      General: No focal deficit present.      Mental Status: She is alert and oriented to person, place, and time.      Motor: No tremor.   Psychiatric:         Attention and Perception: Attention normal.         Mood and Affect: Mood and affect normal.         Speech: Speech normal.         Behavior: Behavior normal. Behavior is cooperative.            All of the data above and below has been reviewed by myself and any further interpretations will be reflected in  the assessment and plan.   The data includes review of external notes, and independent interpretation of lab results, procedures, x-rays, and imaging reports.      Assessment:  Gastroesophageal reflux disease without esophagitis  -     FL Upper GI Air Contrast; Future; Expected date: 02/14/2025    Dysphagia, unspecified type    Hiatal hernia  -     FL Upper GI Air Contrast; Future; Expected date: 02/14/2025    Hematochezia    Hoarseness    Cough, unspecified type    Gore's esophagus without dysplasia    History of colon polyps    Family history of colon cancer    Long term (current) use of anticoagulants         GERD- on pantoprazole 40mg BID. Still with BT symptoms. Will obtain UGIS. Will need upper endoscopy w/ Ebx do to dysphagia. Will discuss with Dr. Orellana about MACIEL off of PPI due to persistent cough.  Consider vit deficiency due to burning toungue, dry chapped lips. Will get B12 added on to her current labs.  E/C due 2026- consider sooner due to worsening reflux, rectal bleeding    Recommendations:    Schedule upper GI series.  Will discuss case with Dr. Orellana    If any tests, procedures, or imaging has been ordered and you are not contacted to schedule within 1-2 weeks, then you may call the central scheduling department directly at [phone].     Risks, benefits, and alternatives of medical management, any associated procedures, and/or treatment discussed with the patient. Patient given opportunity to ask questions and voices understanding. Patient has elected to proceed with the recommended care modalities as discussed.    Follow up in about 6 months (around 8/14/2025).    Order summary:  Orders Placed This Encounter   Procedures    FL Upper GI Air Contrast          Instructed patient to notify my office if they have not been contacted within two weeks after any procedures, submitting any samples (biopsies, blood, stool, urine, etc.) or after any imaging (X-ray, CT, MRI, etc.).      Brittany  REBECCA Julian    This document may have been created using a voice recognition transcribing system. Incorrect words or phrases may have been missed during proofreading. Please interpret accordingly or contact me for clarification.

## 2025-02-21 ENCOUNTER — TELEPHONE (OUTPATIENT)
Dept: GASTROENTEROLOGY | Facility: CLINIC | Age: 60
End: 2025-02-21

## 2025-02-21 RX ORDER — SUCRALFATE 1 G/1
1 TABLET ORAL 4 TIMES DAILY PRN
Qty: 40 TABLET | Refills: 0 | Status: SHIPPED | OUTPATIENT
Start: 2025-02-21 | End: 2025-03-03

## 2025-02-21 NOTE — TELEPHONE ENCOUNTER
Spoke with patient, gave her remarks and recommendations per [facility]. Patient informed of carafate rx. Patient stated that she has not been scheduled for UGIS yet. She was also informed of NBP's recommendation of GES and she is okay with waiting for results of UGIS before ordering GES. Patient verbalized understanding of this   Information. Order for UGIS faxed to CS, NO PA required.-HARRISON,LPN

## 2025-02-21 NOTE — TELEPHONE ENCOUNTER
May let patient know I discussed her case with Dr. Orellana. We would avoid the EGD w/ BRAVO we discussed during OV due to her ICD (contraindicated with BRAVO). She also recommends trying Carafate for her breakthrough symptoms. I can send a prescription.  Has she been scheduled for her UGIS? Dr. Orellana also recommends a GES to see if a delay in gastric emptying his contributing to her symptoms. We can wait for results of UGIS first if she would like before ordering GES.    Rx sent.  Kn

## 2025-03-17 ENCOUNTER — RESULTS FOLLOW-UP (OUTPATIENT)
Dept: GASTROENTEROLOGY | Facility: CLINIC | Age: 60
End: 2025-03-17
Payer: COMMERCIAL

## 2025-03-17 DIAGNOSIS — R13.10 DYSPHAGIA, UNSPECIFIED TYPE: ICD-10-CM

## 2025-03-17 DIAGNOSIS — K21.9 GASTROESOPHAGEAL REFLUX DISEASE WITHOUT ESOPHAGITIS: ICD-10-CM

## 2025-03-17 DIAGNOSIS — R93.3 ABNORMAL FINDING ON GI TRACT IMAGING: Primary | ICD-10-CM

## 2025-03-17 NOTE — TELEPHONE ENCOUNTER
3/15/2025 UGIS Slight mucosal irregularity of GE jxn. No ulceration. Esophageal motility was nl. No HH, reflux.    Call with results. Her upper GI series overall looked well. There was no reflux noted on UGIS. No hiatal hernia. It did note irregularity in the mucosa where her Barretts Esophagus is located. Likely is inflammation but would recommend an upper endoscopy to evaluate. If still seeing blood in stool would recommend having colonoscopy at same time. Would need to dicsus with Dr. Orellana where to add her on. Is there a certain day of the week that would be better for her for procedure?    And how has her reflux symptoms been? If still with frequent reflux, we can give her samples of Voquenza to try.  KN

## 2025-03-17 NOTE — PROGRESS NOTES
3/15/2025 UGIS Slight mucosal irregularity of GE jxn. No ulceration. Esophageal motility was nl. No HH, reflux.

## 2025-03-19 NOTE — PROGRESS NOTES
Subjective   Patient ID:  MARY Estrada is a 60 y.o. female who presents for follow-up of Atrial Fibrillation      HPI 59 yo female with SLE, coronary spasm, NICM, WCT s/p ICD, persistent AF, HLD, hypothyroid, PVCs     Background:  Previously managed by Dr. Fernandes.  [facility] pediatrician; mother of Italo Jaramillo (Internal Medicine)    Hx coronary spasm, on CCB, cath x2 reportedly negative  Has had cardiomyopathy.  Had persistent AF. Placed on Propafenone  Presented with wide complex tachycardia, DCCV en route to ED.    +FHx fo SCD: brother (CHF) and son (age 13, while playing video game)    Dual chamber ICD implanted 9/18/23 Dr. Yoo ([place])  Recurrence of persistent AF.  Didn't tolerate multaq for AF.     cMRI: no scar. 43% LVEF.  echo 2023: 40%  event monitor: SR, 9% PVCs (multifocal)  Stress test 1/2019 neg     2/19/2024:    Successful pulmonary vein RF ablation (challenging/difficult, given challenging transseptal puncture as well as multiple arrhythmias as detailed).    Additional linear lesions in the LA (a posterior box lesion set, with 2 posterior lines).    Successful ablation of atrial tachycardia.    Catheter ablation of multiple focal left atrial tachycardias.    Cardioversion was successfully performed with restoration of normal sinus rhythm.    Discharged on amiodarone. Felt poorly on this >> discontinued.    Echo 6/21/24  Left Ventricle: The left ventricle is at the upper limits of normal measuring 5.1 cm. Normal wall thickness. There is mild eccentric hypertrophy. There is mildly reduced systolic function. Ejection fraction by visual approximation is 50%. Grade II diastolic dysfunction.    Right Ventricle: Normal right ventricular cavity size. Wall thickness is normal. Systolic function is normal. Pacemaker lead present in the ventricle.    Left Atrium: Left atrium is severely dilated.    Mitral Valve: There is mild regurgitation.    Pulmonary Artery: Pulmonary artery pressure could not  be estimated.    IVC/SVC: Normal venous pressure at 3 mmHg.        Update:  Has been doing well. Denies sustained palpitations (rare episodes lasting a few seconds), denies shortness of breath.   Does not exercise. Has lost 50 lbs.  Device interrogation reveals stable function of the leads, RA pacing 97% RV pacing 5% AF burden 0%, no ventricular arrhythmias.    Of note, chart indicates CVA in 2019. Her symptoms were ultimately attributed to hypothyroid,    Review of Systems   Constitutional: Negative. Negative for fever and malaise/fatigue.   HENT:  Negative for congestion and sore throat.    Cardiovascular:  Negative for chest pain, dyspnea on exertion, irregular heartbeat, leg swelling, near-syncope, orthopnea, palpitations, paroxysmal nocturnal dyspnea and syncope.   Respiratory:  Negative for cough and shortness of breath.    Gastrointestinal:  Negative for abdominal pain, constipation and diarrhea.   Neurological:  Negative for dizziness, light-headedness and weakness.   Psychiatric/Behavioral:  Negative for depression. The patient is not nervous/anxious.    All other systems reviewed and are negative.         Objective     Physical Exam  Constitutional:       Appearance: She is well-developed.   Eyes:      General: No scleral icterus.     Conjunctiva/sclera: Conjunctivae normal.   Neck:      Vascular: No JVD.      Trachea: No tracheal deviation.   Cardiovascular:      Rate and Rhythm: Normal rate and regular rhythm.      Chest Wall: PMI is not displaced.   Pulmonary:      Effort: Pulmonary effort is normal. No respiratory distress.      Breath sounds: Normal breath sounds.   Abdominal:      Palpations: Abdomen is soft.      Tenderness: There is no abdominal tenderness.   Musculoskeletal:         General: No tenderness.   Skin:     General: Skin is warm and dry.      Findings: No rash.   Neurological:      Mental Status: She is alert and oriented to person, place, and time.   Psychiatric:         Behavior:  Behavior normal.            Assessment and Plan     1. Atrial fibrillation, persistent    2. NICM (nonischemic cardiomyopathy)    3. ICD (implantable cardioverter-defibrillator) in place    4. Hypothyroidism, unspecified type    5. Systemic lupus erythematosus, organ or system involvement unspecified        Plan:     Doing well overall, without symptomatic recurrence.  Continue current settings and medications.  Discussed continuation of anticoagulation for now.  F/u in 6 months.

## 2025-03-19 NOTE — TELEPHONE ENCOUNTER
Called patient. No answer, left VM that I was calling to give results of latest testing. Asked for return call. -HARRISON,LPN

## 2025-03-24 ENCOUNTER — CLINICAL SUPPORT (OUTPATIENT)
Dept: CARDIOLOGY | Facility: HOSPITAL | Age: 60
End: 2025-03-24
Attending: INTERNAL MEDICINE
Payer: COMMERCIAL

## 2025-03-24 ENCOUNTER — OFFICE VISIT (OUTPATIENT)
Dept: ELECTROPHYSIOLOGY | Facility: CLINIC | Age: 60
End: 2025-03-24
Payer: COMMERCIAL

## 2025-03-24 ENCOUNTER — HOSPITAL ENCOUNTER (OUTPATIENT)
Dept: CARDIOLOGY | Facility: CLINIC | Age: 60
Discharge: HOME | End: 2025-03-24
Payer: COMMERCIAL

## 2025-03-24 VITALS
SYSTOLIC BLOOD PRESSURE: 124 MMHG | HEART RATE: 89 BPM | BODY MASS INDEX: 23.03 KG/M2 | HEIGHT: 65 IN | DIASTOLIC BLOOD PRESSURE: 78 MMHG | WEIGHT: 138.25 LBS

## 2025-03-24 DIAGNOSIS — I48.19 ATRIAL FIBRILLATION, PERSISTENT: ICD-10-CM

## 2025-03-24 DIAGNOSIS — I48.19 ATRIAL FIBRILLATION, PERSISTENT: Primary | ICD-10-CM

## 2025-03-24 DIAGNOSIS — E03.9 HYPOTHYROIDISM, UNSPECIFIED TYPE: ICD-10-CM

## 2025-03-24 DIAGNOSIS — I42.8 NICM (NONISCHEMIC CARDIOMYOPATHY): ICD-10-CM

## 2025-03-24 DIAGNOSIS — Z95.810 ICD (IMPLANTABLE CARDIOVERTER-DEFIBRILLATOR) IN PLACE: ICD-10-CM

## 2025-03-24 DIAGNOSIS — M32.10 SYSTEMIC LUPUS ERYTHEMATOSUS, ORGAN OR SYSTEM INVOLVEMENT UNSPECIFIED: ICD-10-CM

## 2025-03-24 LAB
OHS QRS DURATION: 92 MS
OHS QTC CALCULATION: 489 MS

## 2025-03-24 PROCEDURE — 93010 ELECTROCARDIOGRAM REPORT: CPT | Mod: S$GLB,,, | Performed by: STUDENT IN AN ORGANIZED HEALTH CARE EDUCATION/TRAINING PROGRAM

## 2025-03-24 PROCEDURE — 4010F ACE/ARB THERAPY RXD/TAKEN: CPT | Mod: CPTII,S$GLB,, | Performed by: INTERNAL MEDICINE

## 2025-03-24 PROCEDURE — 3074F SYST BP LT 130 MM HG: CPT | Mod: CPTII,S$GLB,, | Performed by: INTERNAL MEDICINE

## 2025-03-24 PROCEDURE — 99214 OFFICE O/P EST MOD 30 MIN: CPT | Mod: S$GLB,,, | Performed by: INTERNAL MEDICINE

## 2025-03-24 PROCEDURE — 93283 PRGRMG EVAL IMPLANTABLE DFB: CPT

## 2025-03-24 PROCEDURE — 99999 PR PBB SHADOW E&M-EST. PATIENT-LVL III: CPT | Mod: PBBFAC,,, | Performed by: INTERNAL MEDICINE

## 2025-03-24 PROCEDURE — 93005 ELECTROCARDIOGRAM TRACING: CPT | Mod: S$GLB,,, | Performed by: INTERNAL MEDICINE

## 2025-03-24 PROCEDURE — 1159F MED LIST DOCD IN RCRD: CPT | Mod: CPTII,S$GLB,, | Performed by: INTERNAL MEDICINE

## 2025-03-24 PROCEDURE — 3008F BODY MASS INDEX DOCD: CPT | Mod: CPTII,S$GLB,, | Performed by: INTERNAL MEDICINE

## 2025-03-24 PROCEDURE — 3078F DIAST BP <80 MM HG: CPT | Mod: CPTII,S$GLB,, | Performed by: INTERNAL MEDICINE

## 2025-03-24 NOTE — TELEPHONE ENCOUNTER
Spoke with patient. She was notified of results/remarks and voiced understanding. Patient said the radiologist told her that she did have a small HH. Sliding in and out. Patient is no longer seeing blood in stool. Patient said a Monday would work best for EGD.    Patient advised she's been doing well as far as reflux symptoms. DMP

## 2025-03-25 NOTE — TELEPHONE ENCOUNTER
EGD order signed. CLD the day prior to EGD and NPO except meds after midnight. She will need to not take her Mounjaro one week prior to EGD. No Eliquis the day prior to EGD. If needs Monday for procedure, then okay to add on to 4/28/2025 Monday.  RUDI   30-Oct-2019 22:00

## 2025-03-25 NOTE — TELEPHONE ENCOUNTER
Patient notified of NBP's remarks and voiced understanding. Epic schedule updated. Order faxed to central scheduling. No PA required. DMP

## 2025-04-14 NOTE — TELEPHONE ENCOUNTER
"  [facility]      Provider: Haily Orellana MD    Patient Name: MARY Estrada                                                                       (age):1965  60 y.o.           Gender: female   Phone: [phone]     Referring Physician: Nicki Hernández     Vital Signs:   Height - 5' 5"  Weight - 138 lb    Plan: EGD w/EBx (EG irreg on [facility]GIS)    Encounter Diagnoses   Name Primary?    Gastroesophageal reflux disease without esophagitis     Dysphagia, unspecified type     Abnormal finding on GI tract imaging Yes         History:      Past Medical History:   Diagnosis Date    A-fib     ADD (attention deficit disorder)     Anxiety disorder, unspecified     BMI 28.0-28.9,adult     Cardiomyopathy     CHF (congestive heart failure)     Depression     Essential (primary) hypertension     GERD (gastroesophageal reflux disease)     High cholesterol     ICD (implantable cardioverter-defibrillator) in place     Mild intermittent asthma, uncomplicated     Systemic lupus erythematosus, organ or system involvement unspecified     Unspecified urinary incontinence     Vasospasm, coronary:  and  MI but no obstructive CAD     Venous insufficiency of leg       Past Surgical History:   Procedure Laterality Date    ABDOMINOPLASTY  2017    ABLATION OF ARRHYTHMOGENIC FOCUS FOR ATRIAL FIBRILLATION N/A 2024    Procedure: Ablation atrial fibrillation;  Surgeon: Jose Alejandro Fernandes MD;  Location: [facility] EP LAB;  Service: Cardiology;  Laterality: N/A;  AF, MÓNICA, PVI, RFA, Carto, Gen, DM, 3 Prep *dcICD BSCI*    ABLATION, ATRIAL FLUTTER, ATYPICAL  2024    Procedure: Ablation, Atrial Flutter, Atypical;  Surgeon: Jose Alejandro Fernandes MD;  Location: [facility] EP LAB;  Service: Cardiology;;    ABLATION, ATRIAL TACHYCARDIA  2024    Procedure: Ablation, Atrial Tachycardia;  Surgeon: Jose Alejandro Fernandes MD;  Location: [facility] EP LAB;  Service: Cardiology;;    AUGMENTATION OF BREAST       SECTION      COLONOSCOPY  " 2022    use adult colonoscope    HYSTERECTOMY      pelvic floor reconstruction   2013    TRANSESOPHAGEAL ECHOCARDIOGRAPHY N/A 2/19/2024    Procedure: ECHOCARDIOGRAM, TRANSESOPHAGEAL;  Surgeon: Klarissa Garcia MD;  Location: [facility] EP [facility];  Service: Cardiology;  Laterality: N/A;    TREATMENT OF CARDIAC ARRHYTHMIA  2/19/2024    Procedure: Cardioversion or Defibrillation;  Surgeon: Jose Alejandro Fernandes MD;  Location: [facility] EP LAB;  Service: Cardiology;;      Medication List with Changes/Refills   Current Medications    ALBUTEROL (PROVENTIL) 2.5 MG /3 ML (0.083 %) NEBULIZER SOLUTION    Take 2.5 mg by nebulization every 6 (six) hours as needed. Rescue    ATORVASTATIN (LIPITOR) 20 MG TABLET    TAKE ONE TABLET BY MOUTH ONE TIME A DAY AS DIRECTED FOR CHOLESTEROL    BACILLUS COAGULANS ORAL    1 once a day    BENLYSTA 200 MG/ML ATIN    INJECT ONE PEN INJECTOR SUBCUTANEOUSLY ONCE A WEEK    CARVEDILOL (COREG) 12.5 MG TABLET    Take 1 tablet (12.5 mg total) by mouth 2 (two) times daily.    COENZYME Q10 10 MG CAPSULE    1 once a day    DENOSUMAB (PROLIA) 60 MG/ML SYRG    Inject 60 mg into the skin every 6 (six) months.    DULOXETINE (CYMBALTA) 60 MG CAPSULE    TAKE ONE CAPSULE BY MOUTH ONE TIME A DAY AS DIRECTED    ELIQUIS 5 MG TAB    Take 5 mg by mouth 2 (two) times daily.    ENTRESTO 24-26 MG PER TABLET    Take 1 tablet by mouth 2 (two) times daily.    FUROSEMIDE (LASIX) 40 MG TABLET    Pt takes 40 in the am and 20 in the pm    GABAPENTIN (NEURONTIN) 100 MG CAPSULE    Take 200 mg by mouth every evening.    GABAPENTIN (NEURONTIN) 300 MG CAPSULE    Take 300 mg by mouth.    HYDROXYCHLOROQUINE (PLAQUENIL) 200 MG TABLET    TAKE ONE TABLET BY MOUTH TWO TIMES A DAY AS DIRECTED FOR ARTHRITIS    LEFLUNOMIDE (ARAVA) 20 MG TAB    TAKE ONE TABLET BY MOUTH ONE TIME A DAY AS DIRECTED FOR ARTHRITIS    LEVOTHYROXINE (SYNTHROID) 125 MCG TABLET    TAKE ONE TABLET BY MOUTH ONE TIME A DAY IN THE MORNING on empty stomach AS DIRECTED FOR THYROID  SUPPLEMENT    LISDEXAMFETAMINE (VYVANSE) 20 MG CAPSULE    Take 1 capsule by mouth once daily.    MONTELUKAST (SINGULAIR) 10 MG TABLET    TAKE ONE TABLET BY MOUTH ONE TIME A DAY AS DIRECTED FOR ALLERGIES AND ASTHMA    MOUNJARO 5 MG/0.5 ML PNIJ    Inject 5 mg into the skin once a week.    MULTIVIT,MIN52-FOLIC-VITK-CQ10 ORAL    Take by mouth.    OMEGA-3 FATTY ACIDS/FISH OIL (FISH OIL-OMEGA-3 FATTY ACIDS) 300-1,000 MG CAPSULE    Take by mouth once daily.    ONDANSETRON (ZOFRAN-ODT) 8 MG TBDL    8 mg as needed.    PANTOPRAZOLE (PROTONIX) 40 MG TABLET    Take 1 tablet (40 mg total) by mouth once daily.    POTASSIUM CHLORIDE SA (K-DUR,KLOR-CON) 20 MEQ TABLET    TAKE ONE TABLET BY MOUTH TWO TIMES A DAY AS DIRECTED FOR POTASSIUM SUPPLEMENT    PREDNISONE (DELTASONE) 20 MG TABLET        PREDNISONE (DELTASONE) 5 MG TABLET    Take 5 mg by mouth every 48 hours as needed.    SOY ISOFL/BLK COH/GR TEA/YERBA (ESTROVEN ENERGY ORAL)    Take 1 tablet by mouth Daily.    TRELEGY ELLIPTA 200-62.5-25 MCG INHALER    INHALE ONE INHALATION BY MOUTH ONE TIME A DAY AS DIRECTED FOR BREATHING    ZINC OXIDE, BULK, MISC    1 once a day      Review of patient's allergies indicates:   Allergen Reactions    Sulfa (sulfonamide antibiotics) Swelling and Rash     Lips swelling     Benzoyl peroxide     Benzoyl peroxide Hives    Botox cosmetic [onabotulinumtoxina (cosmetic)]     Botox [onabotulinumtoxina] Hives    Sulfa (sulfonamide antibiotics)       Family History   Problem Relation Name Age of Onset    Heart disease Mother  72    Colon cancer Father  72    Colonic polyp Brother      No Known Problems Maternal Grandmother      No Known Problems Maternal Grandfather      No Known Problems Paternal Grandmother      No Known Problems Paternal Grandfather      Heart failure Son  13    Celiac disease Neg Hx      Liver cancer Neg Hx      Liver disease Neg Hx      Stomach cancer Neg Hx      Crohn's disease Neg Hx        Social History[1]        [1]   Social  History  Tobacco Use    Smoking status: Never    Smokeless tobacco: Never   Substance Use Topics    Alcohol use: Never     Comment: rare    Drug use: Never

## 2025-04-14 NOTE — TELEPHONE ENCOUNTER
S/w pt and told her that I was calling as a courtesy regarding up coming EGD with NBP on 4/28/25, Monday and wanted to verify that she has her paper prep instructions.  I reminded pt CLD the day piror to EGD and NPO except meds after midnight. Do not to take Mounjaro 7 days before the procedure and no Eliquis the day prior to EGD. Pt acknowledge she understood. I also mentioned that [facility] will call the day before (Fri) with the arrival time, GI Lab is located on the third floor, and to pre-register anytime within the next two weeks. gunnar

## 2025-04-28 ENCOUNTER — OUTSIDE PLACE OF SERVICE (OUTPATIENT)
Dept: GASTROENTEROLOGY | Facility: CLINIC | Age: 60
End: 2025-04-28

## 2025-05-05 ENCOUNTER — RESULTS FOLLOW-UP (OUTPATIENT)
Dept: GASTROENTEROLOGY | Facility: CLINIC | Age: 60
End: 2025-05-05
Payer: COMMERCIAL

## 2025-05-06 NOTE — TELEPHONE ENCOUNTER
EBx reflux.    Notify Dr. Mcghee that her EBx were benign.  She has changes of reflux that was not unexpected. No damage or BE of the esophagus.  Okay to continue panto 40 BID to help minimize her reflux Sx.  Okay to take the famotidine up to TID as needed for breakthrough reflux.  Keep her follow up OV with me as is and notify me sooner if any issues.  NBP

## 2025-05-08 ENCOUNTER — CLINICAL SUPPORT (OUTPATIENT)
Dept: CARDIOLOGY | Facility: HOSPITAL | Age: 60
End: 2025-05-08
Attending: INTERNAL MEDICINE
Payer: COMMERCIAL

## 2025-05-08 ENCOUNTER — CLINICAL SUPPORT (OUTPATIENT)
Dept: CARDIOLOGY | Facility: HOSPITAL | Age: 60
End: 2025-05-08
Payer: COMMERCIAL

## 2025-05-08 DIAGNOSIS — Z95.810 PRESENCE OF AUTOMATIC (IMPLANTABLE) CARDIAC DEFIBRILLATOR: ICD-10-CM

## 2025-05-08 DIAGNOSIS — I49.01 VENTRICULAR FIBRILLATION: ICD-10-CM

## 2025-05-08 PROCEDURE — 93296 REM INTERROG EVL PM/IDS: CPT | Performed by: INTERNAL MEDICINE

## 2025-05-08 PROCEDURE — 93295 DEV INTERROG REMOTE 1/2/MLT: CPT | Mod: ,,, | Performed by: INTERNAL MEDICINE

## 2025-05-09 NOTE — TELEPHONE ENCOUNTER
Patient was notified of results/remarks and voiced understanding. Patient will be keeping her follow up OV as scheduled. DMP

## 2025-05-19 LAB
OHS CV AF BURDEN PERCENT: < 1
OHS CV DC REMOTE DEVICE TYPE: NORMAL
OHS CV RV PACING PERCENT: 4 %

## 2025-07-16 DIAGNOSIS — Z95.810 PRESENCE OF AUTOMATIC (IMPLANTABLE) CARDIAC DEFIBRILLATOR: Primary | ICD-10-CM

## 2025-07-16 DIAGNOSIS — I48.19 ATRIAL FIBRILLATION, PERSISTENT: ICD-10-CM

## 2025-07-16 RX ORDER — CARVEDILOL 6.25 MG/1
12.5 TABLET ORAL 2 TIMES DAILY
Qty: 120 TABLET | Refills: 12 | Status: SHIPPED | OUTPATIENT
Start: 2025-07-16

## 2025-07-24 ENCOUNTER — PATIENT MESSAGE (OUTPATIENT)
Dept: ELECTROPHYSIOLOGY | Facility: CLINIC | Age: 60
End: 2025-07-24
Payer: COMMERCIAL

## 2025-08-07 ENCOUNTER — CLINICAL SUPPORT (OUTPATIENT)
Dept: CARDIOLOGY | Facility: HOSPITAL | Age: 60
End: 2025-08-07
Payer: COMMERCIAL

## 2025-08-07 ENCOUNTER — CLINICAL SUPPORT (OUTPATIENT)
Dept: CARDIOLOGY | Facility: HOSPITAL | Age: 60
End: 2025-08-07
Attending: INTERNAL MEDICINE
Payer: COMMERCIAL

## 2025-08-07 DIAGNOSIS — Z95.810 PRESENCE OF AUTOMATIC (IMPLANTABLE) CARDIAC DEFIBRILLATOR: ICD-10-CM

## 2025-08-07 DIAGNOSIS — I49.01 VENTRICULAR FIBRILLATION: ICD-10-CM

## 2025-08-07 PROCEDURE — 93296 REM INTERROG EVL PM/IDS: CPT | Performed by: INTERNAL MEDICINE

## 2025-08-07 PROCEDURE — 93295 DEV INTERROG REMOTE 1/2/MLT: CPT | Mod: ,,, | Performed by: INTERNAL MEDICINE

## 2025-09-04 LAB
OHS CV AF BURDEN PERCENT: < 1
OHS CV DC REMOTE DEVICE TYPE: NORMAL
OHS CV RV PACING PERCENT: 5 %

## (undated) DEVICE — INTRO FAST-CATH SL1 8.5FR 63CM

## (undated) DEVICE — NDL TRNSSPTL BRK-1 18GA 98CM

## (undated) DEVICE — INTRODUCER HEMOSTASIS 7.5F

## (undated) DEVICE — ELECTRODE REM PLYHSV RETURN 9

## (undated) DEVICE — PATCH CARTO REFERENCE

## (undated) DEVICE — R CATH BIDIRECTIONL DF CRV 7FR

## (undated) DEVICE — BOWL FLUID - BACK STOP

## (undated) DEVICE — LINE PRESSURE MONITORING 96IN

## (undated) DEVICE — R CATH ACUSON ACUNAV 8FR

## (undated) DEVICE — COVER PROBE US GEL BAND

## (undated) DEVICE — PAD RADI FEMORAL

## (undated) DEVICE — SHEATH HEMOSTASIS 8.5FR

## (undated) DEVICE — CLAMP TOWEL EASY RELEASE TAB

## (undated) DEVICE — CATH PENTARY F 2-6-2MM 115CM

## (undated) DEVICE — GUIDEWIRE TORAY INOUE

## (undated) DEVICE — SET SMARTABLATE IRR TUBE

## (undated) DEVICE — CATH THERMOCOOL SMTCH SF D F

## (undated) DEVICE — PACK EP DRAPE OMC

## (undated) DEVICE — PAD DEFIB CADENCE ADULT R2

## (undated) DEVICE — TOWEL OR DISP STRL BLUE 4/PK

## (undated) DEVICE — INTRO AGILIS MED CRL 8.5F 71CM

## (undated) DEVICE — INTRODUCER FLEXOR 10FR 80CM

## (undated) DEVICE — Device

## (undated) DEVICE — R CATH HIS OCTAPOLAR 7FRX115CM

## (undated) DEVICE — NDL TRNSSPTL BRK-1 18GA 71CM